# Patient Record
Sex: FEMALE | Race: BLACK OR AFRICAN AMERICAN | Employment: UNEMPLOYED | URBAN - METROPOLITAN AREA
[De-identification: names, ages, dates, MRNs, and addresses within clinical notes are randomized per-mention and may not be internally consistent; named-entity substitution may affect disease eponyms.]

---

## 2020-06-16 ENCOUNTER — HOSPITAL ENCOUNTER (INPATIENT)
Age: 25
LOS: 2 days | Discharge: HOME | DRG: 638 | End: 2020-06-18
Attending: STUDENT IN AN ORGANIZED HEALTH CARE EDUCATION/TRAINING PROGRAM | Admitting: FAMILY MEDICINE
Payer: COMMERCIAL

## 2020-06-16 ENCOUNTER — APPOINTMENT (OUTPATIENT)
Dept: GENERAL RADIOLOGY | Age: 25
DRG: 638 | End: 2020-06-16
Attending: PHYSICIAN ASSISTANT
Payer: COMMERCIAL

## 2020-06-16 ENCOUNTER — APPOINTMENT (OUTPATIENT)
Dept: CT IMAGING | Age: 25
DRG: 638 | End: 2020-06-16
Attending: FAMILY MEDICINE
Payer: COMMERCIAL

## 2020-06-16 DIAGNOSIS — E13.10 DIABETIC KETOACIDOSIS WITHOUT COMA ASSOCIATED WITH OTHER SPECIFIED DIABETES MELLITUS (HCC): Primary | ICD-10-CM

## 2020-06-16 PROBLEM — E11.10 DKA (DIABETIC KETOACIDOSES): Status: ACTIVE | Noted: 2020-06-16

## 2020-06-16 LAB
ADMINISTERED INITIALS, ADMINIT: NORMAL
ADMINISTERED INITIALS, ADMINIT: NORMAL
ALBUMIN SERPL-MCNC: 4.8 G/DL (ref 3.5–5)
ALBUMIN/GLOB SERPL: 0.9 {RATIO} (ref 1.1–2.2)
ALP SERPL-CCNC: 86 U/L (ref 45–117)
ALT SERPL-CCNC: 17 U/L (ref 12–78)
ANION GAP SERPL CALC-SCNC: 16 MMOL/L (ref 5–15)
ANION GAP SERPL CALC-SCNC: 19 MMOL/L (ref 5–15)
APPEARANCE UR: CLEAR
APTT PPP: 29.2 SEC (ref 22.1–32)
ARTERIAL PATENCY WRIST A: ABNORMAL
ARTERIAL PATENCY WRIST A: ABNORMAL
ARTERIAL PATENCY WRIST A: YES
ARTERIAL PATENCY WRIST A: YES
AST SERPL-CCNC: 8 U/L (ref 15–37)
BACTERIA URNS QL MICRO: NEGATIVE /HPF
BASE DEFICIT BLD-SCNC: 25 MMOL/L
BASE DEFICIT BLD-SCNC: 27 MMOL/L
BASOPHILS # BLD: 0 K/UL (ref 0–0.1)
BASOPHILS NFR BLD: 0 % (ref 0–1)
BDY SITE: ABNORMAL
BILIRUB SERPL-MCNC: 0.3 MG/DL (ref 0.2–1)
BILIRUB UR QL: NEGATIVE
BUN SERPL-MCNC: 16 MG/DL (ref 6–20)
BUN SERPL-MCNC: 17 MG/DL (ref 6–20)
BUN/CREAT SERPL: 18 (ref 12–20)
BUN/CREAT SERPL: 21 (ref 12–20)
CA-I BLD-SCNC: 1.45 MMOL/L (ref 1.12–1.32)
CA-I BLD-SCNC: 1.54 MMOL/L (ref 1.12–1.32)
CA-I BLD-SCNC: 1.57 MMOL/L (ref 1.12–1.32)
CA-I BLD-SCNC: 1.57 MMOL/L (ref 1.12–1.32)
CALCIUM SERPL-MCNC: 10.5 MG/DL (ref 8.5–10.1)
CALCIUM SERPL-MCNC: 9.1 MG/DL (ref 8.5–10.1)
CHLORIDE SERPL-SCNC: 113 MMOL/L (ref 97–108)
CHLORIDE SERPL-SCNC: 122 MMOL/L (ref 97–108)
CO2 SERPL-SCNC: 6 MMOL/L (ref 21–32)
CO2 SERPL-SCNC: 6 MMOL/L (ref 21–32)
COLOR UR: ABNORMAL
COMMENT, HOLDF: NORMAL
COMMENT, HOLDF: NORMAL
CREAT SERPL-MCNC: 0.75 MG/DL (ref 0.55–1.02)
CREAT SERPL-MCNC: 0.94 MG/DL (ref 0.55–1.02)
D DIMER PPP FEU-MCNC: 0.56 MG/L FEU (ref 0–0.65)
D50 ADMINISTERED, D50ADM: 0 ML
D50 ADMINISTERED, D50ADM: 0 ML
D50 ORDER, D50ORD: 0 ML
D50 ORDER, D50ORD: 0 ML
DIFFERENTIAL METHOD BLD: ABNORMAL
EOSINOPHIL # BLD: 0 K/UL (ref 0–0.4)
EOSINOPHIL NFR BLD: 0 % (ref 0–7)
EPITH CASTS URNS QL MICRO: ABNORMAL /LPF
ERYTHROCYTE [DISTWIDTH] IN BLOOD BY AUTOMATED COUNT: 14.7 % (ref 11.5–14.5)
EST. AVERAGE GLUCOSE BLD GHB EST-MCNC: 171 MG/DL
FERRITIN SERPL-MCNC: 15 NG/ML (ref 8–252)
FIBRINOGEN PPP-MCNC: 602 MG/DL (ref 200–475)
GAS FLOW.O2 O2 DELIVERY SYS: ABNORMAL L/MIN
GLOBULIN SER CALC-MCNC: 5.4 G/DL (ref 2–4)
GLSCOM COMMENTS: NORMAL
GLSCOM COMMENTS: NORMAL
GLUCOSE BLD STRIP.AUTO-MCNC: 154 MG/DL (ref 65–100)
GLUCOSE BLD STRIP.AUTO-MCNC: 172 MG/DL (ref 65–100)
GLUCOSE BLD STRIP.AUTO-MCNC: 172 MG/DL (ref 65–100)
GLUCOSE BLD STRIP.AUTO-MCNC: 177 MG/DL (ref 65–100)
GLUCOSE BLD STRIP.AUTO-MCNC: 200 MG/DL (ref 65–100)
GLUCOSE BLD STRIP.AUTO-MCNC: 250 MG/DL (ref 65–100)
GLUCOSE SERPL-MCNC: 160 MG/DL (ref 65–100)
GLUCOSE SERPL-MCNC: 183 MG/DL (ref 65–100)
GLUCOSE UR STRIP.AUTO-MCNC: >1000 MG/DL
GLUCOSE, GLC: 154 MG/DL
GLUCOSE, GLC: 172 MG/DL
HBA1C MFR BLD: 7.6 % (ref 4–5.6)
HCO3 BLD-SCNC: 4.3 MMOL/L (ref 22–26)
HCO3 BLD-SCNC: 6 MMOL/L (ref 22–26)
HCT VFR BLD AUTO: 47.5 % (ref 35–47)
HGB BLD-MCNC: 13.9 G/DL (ref 11.5–16)
HGB UR QL STRIP: NEGATIVE
HIGH TARGET, HITG: 250 MG/DL
HIGH TARGET, HITG: 250 MG/DL
IMM GRANULOCYTES # BLD AUTO: 0.2 K/UL (ref 0–0.04)
IMM GRANULOCYTES NFR BLD AUTO: 1 % (ref 0–0.5)
INR PPP: 1.1 (ref 0.9–1.1)
INSULIN ADMINSTERED, INSADM: 1.9 UNITS/HOUR
INSULIN ADMINSTERED, INSADM: 2.2 UNITS/HOUR
INSULIN ORDER, INSORD: 1.9 UNITS/HOUR
INSULIN ORDER, INSORD: 2.2 UNITS/HOUR
KETONES UR QL STRIP.AUTO: >80 MG/DL
LACTATE SERPL-SCNC: 1.9 MMOL/L (ref 0.4–2)
LDH SERPL L TO P-CCNC: 226 U/L (ref 81–246)
LEUKOCYTE ESTERASE UR QL STRIP.AUTO: NEGATIVE
LOW TARGET, LOT: 150 MG/DL
LOW TARGET, LOT: 150 MG/DL
LYMPHOCYTES # BLD: 1.2 K/UL (ref 0.8–3.5)
LYMPHOCYTES NFR BLD: 5 % (ref 12–49)
MAGNESIUM SERPL-MCNC: 2.6 MG/DL (ref 1.6–2.4)
MCH RBC QN AUTO: 23.8 PG (ref 26–34)
MCHC RBC AUTO-ENTMCNC: 29.3 G/DL (ref 30–36.5)
MCV RBC AUTO: 81.3 FL (ref 80–99)
MINUTES UNTIL NEXT BG, NBG: 60 MIN
MINUTES UNTIL NEXT BG, NBG: 60 MIN
MONOCYTES # BLD: 1.9 K/UL (ref 0–1)
MONOCYTES NFR BLD: 8 % (ref 5–13)
MULTIPLIER, MUL: 0.02
MULTIPLIER, MUL: 0.02
NEUTS SEG # BLD: 20.7 K/UL (ref 1.8–8)
NEUTS SEG NFR BLD: 86 % (ref 32–75)
NITRITE UR QL STRIP.AUTO: NEGATIVE
NRBC # BLD: 0 K/UL (ref 0–0.01)
NRBC BLD-RTO: 0 PER 100 WBC
ORDER INITIALS, ORDINIT: NORMAL
ORDER INITIALS, ORDINIT: NORMAL
PCO2 BLD: 18.1 MMHG (ref 35–45)
PCO2 BLD: 24 MMHG (ref 35–45)
PCO2 BLD: <15 MMHG (ref 35–45)
PCO2 BLD: <15 MMHG (ref 35–45)
PH BLD: 6.98 [PH] (ref 7.35–7.45)
PH BLD: 7 [PH] (ref 7.35–7.45)
PH BLD: 7.04 [PH] (ref 7.35–7.45)
PH BLD: 7.04 [PH] (ref 7.35–7.45)
PH UR STRIP: 5 [PH] (ref 5–8)
PHOSPHATE SERPL-MCNC: 3.9 MG/DL (ref 2.6–4.7)
PHOSPHATE SERPL-MCNC: 5.5 MG/DL (ref 2.6–4.7)
PLATELET # BLD AUTO: 518 K/UL (ref 150–400)
PMV BLD AUTO: 10.1 FL (ref 8.9–12.9)
PO2 BLD: 122 MMHG (ref 80–100)
PO2 BLD: 124 MMHG (ref 80–100)
PO2 BLD: 25 MMHG (ref 80–100)
PO2 BLD: 36 MMHG (ref 80–100)
POTASSIUM SERPL-SCNC: 4.6 MMOL/L (ref 3.5–5.1)
POTASSIUM SERPL-SCNC: 4.8 MMOL/L (ref 3.5–5.1)
PROCALCITONIN SERPL-MCNC: 0.11 NG/ML
PROT SERPL-MCNC: 10.2 G/DL (ref 6.4–8.2)
PROT UR STRIP-MCNC: 30 MG/DL
PROTHROMBIN TIME: 10.9 SEC (ref 9–11.1)
RBC # BLD AUTO: 5.84 M/UL (ref 3.8–5.2)
RBC #/AREA URNS HPF: ABNORMAL /HPF (ref 0–5)
RBC MORPH BLD: ABNORMAL
RBC MORPH BLD: ABNORMAL
SAMPLES BEING HELD,HOLD: NORMAL
SAMPLES BEING HELD,HOLD: NORMAL
SAO2 % BLD: 25 % (ref 92–97)
SAO2 % BLD: 42 % (ref 92–97)
SERVICE CMNT-IMP: ABNORMAL
SODIUM SERPL-SCNC: 138 MMOL/L (ref 136–145)
SODIUM SERPL-SCNC: 144 MMOL/L (ref 136–145)
SP GR UR REFRACTOMETRY: 1.02 (ref 1–1.03)
SPECIMEN TYPE: ABNORMAL
THERAPEUTIC RANGE,PTTT: NORMAL SECS (ref 58–77)
TOTAL RESP. RATE, ITRR: 38
TSH SERPL DL<=0.05 MIU/L-ACNC: 0.62 UIU/ML (ref 0.36–3.74)
UR CULT HOLD, URHOLD: NORMAL
UROBILINOGEN UR QL STRIP.AUTO: 0.2 EU/DL (ref 0.2–1)
WBC # BLD AUTO: 24 K/UL (ref 3.6–11)
WBC URNS QL MICRO: ABNORMAL /HPF (ref 0–4)

## 2020-06-16 PROCEDURE — 84100 ASSAY OF PHOSPHORUS: CPT

## 2020-06-16 PROCEDURE — 82728 ASSAY OF FERRITIN: CPT

## 2020-06-16 PROCEDURE — 83036 HEMOGLOBIN GLYCOSYLATED A1C: CPT

## 2020-06-16 PROCEDURE — 71045 X-RAY EXAM CHEST 1 VIEW: CPT

## 2020-06-16 PROCEDURE — 74176 CT ABD & PELVIS W/O CONTRAST: CPT

## 2020-06-16 PROCEDURE — 84443 ASSAY THYROID STIM HORMONE: CPT

## 2020-06-16 PROCEDURE — 80048 BASIC METABOLIC PNL TOTAL CA: CPT

## 2020-06-16 PROCEDURE — 85379 FIBRIN DEGRADATION QUANT: CPT

## 2020-06-16 PROCEDURE — 81001 URINALYSIS AUTO W/SCOPE: CPT

## 2020-06-16 PROCEDURE — 82803 BLOOD GASES ANY COMBINATION: CPT

## 2020-06-16 PROCEDURE — 74011636637 HC RX REV CODE- 636/637: Performed by: NURSE PRACTITIONER

## 2020-06-16 PROCEDURE — 87086 URINE CULTURE/COLONY COUNT: CPT

## 2020-06-16 PROCEDURE — 65660000001 HC RM ICU INTERMED STEPDOWN

## 2020-06-16 PROCEDURE — 85610 PROTHROMBIN TIME: CPT

## 2020-06-16 PROCEDURE — 74011000258 HC RX REV CODE- 258: Performed by: STUDENT IN AN ORGANIZED HEALTH CARE EDUCATION/TRAINING PROGRAM

## 2020-06-16 PROCEDURE — 74011000258 HC RX REV CODE- 258: Performed by: NURSE PRACTITIONER

## 2020-06-16 PROCEDURE — 74011250636 HC RX REV CODE- 250/636

## 2020-06-16 PROCEDURE — 36415 COLL VENOUS BLD VENIPUNCTURE: CPT

## 2020-06-16 PROCEDURE — 74011250636 HC RX REV CODE- 250/636: Performed by: STUDENT IN AN ORGANIZED HEALTH CARE EDUCATION/TRAINING PROGRAM

## 2020-06-16 PROCEDURE — 85384 FIBRINOGEN ACTIVITY: CPT

## 2020-06-16 PROCEDURE — 93005 ELECTROCARDIOGRAM TRACING: CPT

## 2020-06-16 PROCEDURE — 80053 COMPREHEN METABOLIC PANEL: CPT

## 2020-06-16 PROCEDURE — 82962 GLUCOSE BLOOD TEST: CPT

## 2020-06-16 PROCEDURE — 83615 LACTATE (LD) (LDH) ENZYME: CPT

## 2020-06-16 PROCEDURE — 85730 THROMBOPLASTIN TIME PARTIAL: CPT

## 2020-06-16 PROCEDURE — 36600 WITHDRAWAL OF ARTERIAL BLOOD: CPT

## 2020-06-16 PROCEDURE — 83735 ASSAY OF MAGNESIUM: CPT

## 2020-06-16 PROCEDURE — 71250 CT THORAX DX C-: CPT

## 2020-06-16 PROCEDURE — 84145 PROCALCITONIN (PCT): CPT

## 2020-06-16 PROCEDURE — 99285 EMERGENCY DEPT VISIT HI MDM: CPT

## 2020-06-16 PROCEDURE — 74011636637 HC RX REV CODE- 636/637: Performed by: FAMILY MEDICINE

## 2020-06-16 PROCEDURE — 87040 BLOOD CULTURE FOR BACTERIA: CPT

## 2020-06-16 PROCEDURE — 87635 SARS-COV-2 COVID-19 AMP PRB: CPT

## 2020-06-16 PROCEDURE — 74011000258 HC RX REV CODE- 258: Performed by: FAMILY MEDICINE

## 2020-06-16 PROCEDURE — 85025 COMPLETE CBC W/AUTO DIFF WBC: CPT

## 2020-06-16 PROCEDURE — 83605 ASSAY OF LACTIC ACID: CPT

## 2020-06-16 PROCEDURE — 74011000250 HC RX REV CODE- 250: Performed by: NURSE PRACTITIONER

## 2020-06-16 PROCEDURE — 96374 THER/PROPH/DIAG INJ IV PUSH: CPT

## 2020-06-16 PROCEDURE — 74011250636 HC RX REV CODE- 250/636: Performed by: PHYSICIAN ASSISTANT

## 2020-06-16 PROCEDURE — 74011250636 HC RX REV CODE- 250/636: Performed by: NURSE PRACTITIONER

## 2020-06-16 RX ORDER — METFORMIN HYDROCHLORIDE 1000 MG/1
1000 TABLET ORAL 2 TIMES DAILY WITH MEALS
COMMUNITY

## 2020-06-16 RX ORDER — ACETAMINOPHEN 650 MG/1
650 SUPPOSITORY RECTAL
Status: DISCONTINUED | OUTPATIENT
Start: 2020-06-16 | End: 2020-06-18 | Stop reason: HOSPADM

## 2020-06-16 RX ORDER — SODIUM CHLORIDE 0.9 % (FLUSH) 0.9 %
5-40 SYRINGE (ML) INJECTION EVERY 8 HOURS
Status: DISCONTINUED | OUTPATIENT
Start: 2020-06-16 | End: 2020-06-18 | Stop reason: HOSPADM

## 2020-06-16 RX ORDER — SODIUM BICARBONATE 84 MG/ML
50 INJECTION, SOLUTION INTRAVENOUS
Status: COMPLETED | OUTPATIENT
Start: 2020-06-16 | End: 2020-06-16

## 2020-06-16 RX ORDER — CETIRIZINE HCL 10 MG
10 TABLET ORAL
COMMUNITY

## 2020-06-16 RX ORDER — SODIUM CHLORIDE 0.9 % (FLUSH) 0.9 %
5-40 SYRINGE (ML) INJECTION AS NEEDED
Status: DISCONTINUED | OUTPATIENT
Start: 2020-06-16 | End: 2020-06-18 | Stop reason: HOSPADM

## 2020-06-16 RX ORDER — LORAZEPAM 2 MG/ML
INJECTION INTRAMUSCULAR
Status: COMPLETED
Start: 2020-06-16 | End: 2020-06-16

## 2020-06-16 RX ORDER — MAGNESIUM SULFATE 100 %
4 CRYSTALS MISCELLANEOUS AS NEEDED
Status: DISCONTINUED | OUTPATIENT
Start: 2020-06-16 | End: 2020-06-17

## 2020-06-16 RX ORDER — ACETAMINOPHEN 325 MG/1
650 TABLET ORAL
Status: DISCONTINUED | OUTPATIENT
Start: 2020-06-16 | End: 2020-06-18 | Stop reason: HOSPADM

## 2020-06-16 RX ORDER — DEXTROSE MONOHYDRATE AND SODIUM CHLORIDE 5; .45 G/100ML; G/100ML
200 INJECTION, SOLUTION INTRAVENOUS CONTINUOUS
Status: DISCONTINUED | OUTPATIENT
Start: 2020-06-16 | End: 2020-06-16

## 2020-06-16 RX ORDER — LORAZEPAM 2 MG/ML
1 INJECTION INTRAMUSCULAR
Status: COMPLETED | OUTPATIENT
Start: 2020-06-16 | End: 2020-06-16

## 2020-06-16 RX ORDER — ONDANSETRON 2 MG/ML
4 INJECTION INTRAMUSCULAR; INTRAVENOUS
Status: DISCONTINUED | OUTPATIENT
Start: 2020-06-16 | End: 2020-06-18 | Stop reason: HOSPADM

## 2020-06-16 RX ORDER — DIPHENHYDRAMINE HCL 25 MG
25 CAPSULE ORAL
COMMUNITY

## 2020-06-16 RX ORDER — LORAZEPAM 2 MG/ML
0.5 INJECTION INTRAMUSCULAR ONCE
Status: COMPLETED | OUTPATIENT
Start: 2020-06-16 | End: 2020-06-16

## 2020-06-16 RX ORDER — ACETAMINOPHEN 325 MG/1
650 TABLET ORAL
Status: DISCONTINUED | OUTPATIENT
Start: 2020-06-16 | End: 2020-06-16

## 2020-06-16 RX ORDER — SODIUM BICARBONATE 84 MG/ML
INJECTION, SOLUTION INTRAVENOUS
Status: DISCONTINUED
Start: 2020-06-16 | End: 2020-06-16 | Stop reason: WASHOUT

## 2020-06-16 RX ORDER — DEXTROSE MONOHYDRATE 100 MG/ML
0-250 INJECTION, SOLUTION INTRAVENOUS AS NEEDED
Status: DISCONTINUED | OUTPATIENT
Start: 2020-06-16 | End: 2020-06-17

## 2020-06-16 RX ORDER — INSULIN LISPRO 100 [IU]/ML
INJECTION, SOLUTION INTRAVENOUS; SUBCUTANEOUS
Status: DISCONTINUED | OUTPATIENT
Start: 2020-06-17 | End: 2020-06-16

## 2020-06-16 RX ORDER — DEXTROSE MONOHYDRATE AND SODIUM CHLORIDE 5; .9 G/100ML; G/100ML
250 INJECTION, SOLUTION INTRAVENOUS CONTINUOUS
Status: DISCONTINUED | OUTPATIENT
Start: 2020-06-16 | End: 2020-06-16

## 2020-06-16 RX ORDER — MELATONIN
1000 DAILY
COMMUNITY

## 2020-06-16 RX ADMIN — DEXTROSE MONOHYDRATE AND SODIUM CHLORIDE 150 ML/HR: 5; .9 INJECTION, SOLUTION INTRAVENOUS at 18:37

## 2020-06-16 RX ADMIN — SODIUM CHLORIDE 1000 ML: 900 INJECTION, SOLUTION INTRAVENOUS at 16:44

## 2020-06-16 RX ADMIN — SODIUM BICARBONATE 50 MEQ: 84 INJECTION, SOLUTION INTRAVENOUS at 23:46

## 2020-06-16 RX ADMIN — LORAZEPAM 0.5 MG: 2 INJECTION INTRAMUSCULAR at 20:55

## 2020-06-16 RX ADMIN — SODIUM CHLORIDE 1.9 UNITS/HR: 9 INJECTION, SOLUTION INTRAVENOUS at 19:13

## 2020-06-16 RX ADMIN — SODIUM BICARBONATE: 84 INJECTION, SOLUTION INTRAVENOUS at 23:47

## 2020-06-16 RX ADMIN — CEFEPIME HYDROCHLORIDE 2 G: 2 INJECTION, POWDER, FOR SOLUTION INTRAVENOUS at 19:16

## 2020-06-16 RX ADMIN — LORAZEPAM 1 MG: 2 INJECTION INTRAMUSCULAR; INTRAVENOUS at 16:51

## 2020-06-16 RX ADMIN — SODIUM CHLORIDE 0.1 UNITS/KG/HR: 9 INJECTION, SOLUTION INTRAVENOUS at 20:58

## 2020-06-16 RX ADMIN — LORAZEPAM 0.5 MG: 2 INJECTION INTRAMUSCULAR; INTRAVENOUS at 20:55

## 2020-06-16 RX ADMIN — SODIUM CHLORIDE 1000 ML: 900 INJECTION, SOLUTION INTRAVENOUS at 17:47

## 2020-06-16 RX ADMIN — SODIUM CHLORIDE 1000 ML: 900 INJECTION, SOLUTION INTRAVENOUS at 21:13

## 2020-06-16 NOTE — ED NOTES
Pt ambulatory to restroom with standby assistance. 5:48 PM  Pt wheeled back to room in wheelchair, RR increased, pt having more difficulty getting words out. HR up to 160s. PA updated and at bedside to look at patient. Dr. Arron Parr also at bedside. 1822  Intensivist at bedside.

## 2020-06-16 NOTE — PROGRESS NOTES
Admission Medication Reconciliation:        Spoke with Mother by telephone @ [phone], unable to speak with patient face to face at this time due to general isolation precautions in the ED related to COVID-19 pandemic. Mother is a reliable historian. RX query is available at this time. Interview included questions regarding use of:  PTA medications including prescription/OTC, vitamins, supplements, inhaled, topical, injectable, otic and ophthalmic medications      Medication changes (since last review): Added all agents    Thank you for allowing me to participate in the care of your patient. Rikki VargasD, RN # [phone]       Formerly [facility] pharmacy benefit data reflects medications filled and processed through the patient's insurance, however   this data does NOT capture whether the medication was picked up or is currently being taken by the patient. Allergies:  Nickel    Significant PMH/Disease States:   Past Medical History:   Diagnosis Date    Autistic spectrum disorder     Diabetes ([facility]     Kyphosis     TBI (traumatic brain injury) ([facility] [address]     Tic     Lower extremeties     Chief Complaint for this Admission:    Chief Complaint   Patient presents with    Hyperventilating    Abnormal Lab Results    Mental Health Problem     Prior to Admission Medications:   Prior to Admission Medications   Prescriptions Last Dose Informant Taking? cetirizine (ZyrTEC) 10 mg tablet   Yes   Sig: Take 10 mg by mouth daily as needed for Allergies. cholecalciferol (Vitamin D3) (1000 Units /25 mcg) tablet 6/16/2020 at Unknown time  Yes   Sig: Take 1,000 Units by mouth daily. diphenhydrAMINE (BENADRYL) 25 mg capsule 6/9/2020 at Unknown time  Yes   Sig: Take 25 mg by mouth nightly as needed. empagliflozin (Jardiance) 25 mg tablet   Yes   Sig: Take 25 mg by mouth daily. metFORMIN (GLUCOPHAGE) 1,000 mg tablet 6/16/2020 at Unknown time  Yes   Sig: Take 1,000 mg by mouth two (2) times daily (with meals). Facility-Administered Medications: None       Please contact the main inpatient pharmacy with any questions or concerns at [phone] and we will direct you to the clinical pharmacist covering this patient's care while in-house.    LOYD Red

## 2020-06-16 NOTE — ED TRIAGE NOTES
Triage:  Pt to ED via [facility] of [facility] due to abnormal lab work, elevated resp rate, and anxiety presentation. Pts mother states that the patient has become recently more anxious possibly related to COVID issues as well as the general social arrest going on currently. Pt ambulatory into triage, Pt has existing mental and physical disabilities and mom is active care giver.

## 2020-06-16 NOTE — ED NOTES
Pt transported to CT with this RN on cardiac monitor. Settled back into bed, on cardiac monitor x 4.

## 2020-06-16 NOTE — ED PROVIDER NOTES
27 y/o female with pmhx of autism spectrum disorder, DMII, TBI presents with [facility] mother [facility] due to acidosis. Mom noticed the pt acting differently x 4 days. She thought that anxiety was causing her to breath more quickly and have less sleep than usual. Mom has noticed her breathing has become faster each day. Pt has not complained of anything and states \"I feel fine\". Mom notes that the pt never complains of pain or sickness, so it is difficult to identify what is wrong. Pt takes metformin and jardiance without dose changes for at least 6 months. Mom denies cough, fever, change in urination or bowel movements. Past Medical History:   Diagnosis Date    Autistic spectrum disorder     Diabetes ([facility] [address]     Kyphosis     TBI (traumatic brain injury) ([facility] [address] [facility]     Tic     Lower extremeties       Past Surgical History:   Procedure Laterality Date    HX WISDOM TEETH EXTRACTION           History reviewed. No pertinent family history.     Social History     Socioeconomic History    Marital status: SINGLE     Spouse name: Not on file    Number of children: Not on file    Years of education: Not on file    Highest education level: Not on file   Occupational History    Not on file   Social Needs    Financial resource strain: Not on file    Food insecurity     Worry: Not on file     Inability: Not on file    Transportation needs     Medical: Not on file     Non-medical: Not on file   Tobacco Use    Smoking status: Never Smoker    Smokeless tobacco: Never Used   Substance and Sexual Activity    Alcohol use: Not on file    Drug use: Not Currently    Sexual activity: Not Currently   Lifestyle    Physical activity     Days per week: Not on file     Minutes per session: Not on file    Stress: Not on file   Relationships    Social connections     Talks on phone: Not on file     Gets together: Not on file     Attends [employer] service: Not on file     Active member of club or organization: Not on file Attends meetings of clubs or organizations: Not on file     Relationship status: Not on file    Intimate partner violence     Fear of current or ex partner: Not on file     Emotionally abused: Not on file     Physically abused: Not on file     Forced sexual activity: Not on file   Other Topics Concern    Not on file   Social History Narrative    Not on file         ALLERGIES: Nickel    Review of Systems   Constitutional: Negative for fever. HENT: Negative for congestion and sore throat. Respiratory: Negative for cough and shortness of breath. Increased respiratory rate per mom   Cardiovascular: Negative for chest pain. Gastrointestinal: Negative for abdominal pain, nausea and vomiting. Genitourinary: Negative for dysuria. Musculoskeletal: Negative for myalgias. Skin: Negative for rash. Neurological: Negative for dizziness and weakness. Vitals:    06/16/20 1710 06/16/20 1730 06/16/20 1747 06/16/20 1749   BP:  (!) 149/96 (!) 184/92    Pulse: (!) 143 (!) 147 (!) 169 (!) 166   Resp: (!) 34 23 24 28   Temp:       SpO2: 100% 100% 93% 100%   Height:                Physical Exam  Vitals signs and nursing note reviewed. Constitutional:       General: She is not in acute distress. Appearance: She is not ill-appearing. HENT:      Head: Normocephalic. Nose: No rhinorrhea. Mouth/Throat:      Mouth: Mucous membranes are moist.      Pharynx: Oropharynx is clear. No posterior oropharyngeal erythema. Eyes:      Pupils: Pupils are equal, round, and reactive to light. Neck:      Musculoskeletal: Normal range of motion. Cardiovascular:      Rate and Rhythm: Regular rhythm. Tachycardia present. Pulses: Normal pulses. Heart sounds: Normal heart sounds. Pulmonary:      Effort: Tachypnea and respiratory distress present. Breath sounds: Normal breath sounds. No decreased breath sounds or wheezing.       Comments: resp rate of about 30, pt speaking in between breaths  Chest:      Chest wall: No tenderness. Abdominal:      General: Abdomen is flat. Bowel sounds are normal. There is no distension. Palpations: Abdomen is soft. Tenderness: There is no abdominal tenderness. There is no right CVA tenderness, left CVA tenderness, guarding or rebound. Musculoskeletal:      Right lower leg: No edema. Left lower leg: No edema. Skin:     General: Skin is warm. Capillary Refill: Capillary refill takes less than 2 seconds. Findings: No erythema or rash. Neurological:      General: No focal deficit present. Mental Status: She is alert. Cranial Nerves: Cranial nerves are intact. Motor: Motor function is intact. Coordination: Coordination is intact. Psychiatric:         Mood and Affect: Mood is anxious.          Behavior: Behavior normal.         Cognition and Memory: Cognition normal.        Medications   acetaminophen (TYLENOL) tablet 650 mg (has no administration in time range)     Or   acetaminophen (TYLENOL) suppository 650 mg (has no administration in time range)   dextrose 5% and 0.9% NaCl infusion (150 mL/hr IntraVENous New Bag 6/16/20 5689)   insulin regular (NOVOLIN R, HUMULIN R) 100 Units in 0.9% sodium chloride 100 mL infusion (has no administration in time range)   insulin lispro (HUMALOG) injection (has no administration in time range)   glucose chewable tablet 16 g (has no administration in time range)   glucagon (GLUCAGEN) injection 1 mg (has no administration in time range)   dextrose 10% infusion 0-250 mL (has no administration in time range)   cefepime (MAXIPIME) 2 g in 0.9% sodium chloride (MBP/ADV) 100 mL (has no administration in time range)   cefepime (MAXIPIME) 2 g in 0.9% sodium chloride (MBP/ADV) 100 mL (has no administration in time range)   sodium chloride (NS) flush 5-40 mL (has no administration in time range)   sodium chloride (NS) flush 5-40 mL (has no administration in time range)   acetaminophen (TYLENOL) tablet 650 mg (has no administration in time range)   ondansetron (ZOFRAN) injection 4 mg (has no administration in time range)   famotidine (PF) (PEPCID) 20 mg in 0.9% sodium chloride 10 mL injection (has no administration in time range)   sodium chloride 0.9 % bolus infusion 1,000 mL (1,000 mL IntraVENous New Bag 6/16/20 1644)   LORazepam (ATIVAN) injection 1 mg (1 mg IntraVENous Given 6/16/20 1651)   sodium chloride 0.9 % bolus infusion 1,000 mL (1,000 mL IntraVENous New Bag 6/16/20 1747)     XR CHEST PORT   Final Result   IMPRESSION: No acute findings. CT CHEST WO CONT    (Results Pending)   CT ABD PELV WO CONT    (Results Pending)     Labs Reviewed   METABOLIC PANEL, COMPREHENSIVE - Abnormal; Notable for the following components:       Result Value    Chloride 113 (*)     CO2 6 (*)     Anion gap 19 (*)     Glucose 183 (*)     Calcium 10.5 (*)     AST (SGOT) 8 (*)     Protein, total 10.2 (*)     Globulin 5.4 (*)     A-G Ratio 0.9 (*)     All other components within normal limits   CBC WITH AUTOMATED DIFF - Abnormal; Notable for the following components:    WBC 24.0 (*)     RBC 5.84 (*)     HCT 47.5 (*)     MCH 23.8 (*)     MCHC 29.3 (*)     RDW 14.7 (*)     PLATELET 026 (*)     NEUTROPHILS 86 (*)     LYMPHOCYTES 5 (*)     IMMATURE GRANULOCYTES 1 (*)     ABS. NEUTROPHILS 20.7 (*)     ABS. MONOCYTES 1.9 (*)     ABS. IMM.  GRANS. 0.2 (*)     All other components within normal limits   POC EG7 - Abnormal; Notable for the following components:    Calcium, ionized (POC) 1.54 (*)     pH (POC) 7.005 (*)     pCO2 (POC) 24.0 (*)     pO2 (POC) 25 (*)     HCO3 (POC) 6.0 (*)     sO2 (POC) 25 (*)     All other components within normal limits   URINALYSIS W/MICROSCOPIC - Abnormal; Notable for the following components:    Protein 30 (*)     Glucose >1,000 (*)     Ketone >80 (*)     All other components within normal limits   FIBRINOGEN - Abnormal; Notable for the following components:    Fibrinogen 602 (*)     All other components within normal limits   GLUCOSE, POC - Abnormal; Notable for the following components:    Glucose (POC) 177 (*)     All other components within normal limits   CULTURE, BLOOD, PAIRED   URINE CULTURE HOLD SAMPLE   RESPIRATORY PANEL,PCR,NASOPHARYNGEAL   SAMPLES BEING HELD   LACTIC ACID   PROTHROMBIN TIME + INR   PTT   LD   FERRITIN   D DIMER   SARS-COV-2   PROCALCITONIN   MAGNESIUM   PHOSPHORUS   VENOUS BLOOD GAS   SAMPLES BEING HELD   METABOLIC PANEL, BASIC   MAGNESIUM   METABOLIC PANEL, BASIC   HEMOGLOBIN A1C WITH EAG   PHOSPHORUS   TSH 3RD GENERATION         MDM  Number of Diagnoses or Management Options     Amount and/or Complexity of Data Reviewed  Clinical lab tests: reviewed  Tests in the radiology section of CPT®: reviewed  Tests in the medicine section of CPT®: reviewed    Critical Care  Total time providing critical care: 30-74 minutes (45 minutes)         Procedures      Hospitalist Nixon Serve for Admission  5:56 PM    ED Room Number: ER14/14  Patient Name and age:  Yoandy Ulrich 26 y.o.  female  Working Diagnosis: Possible euglycemic DKA, metabolic acidosis, Possible Sepsis, Respiratory Distress, tachycardia, leukocytosis    COVID-19 Suspicion:  yes    Code Status:  Full Code  Readmission: no  Isolation Requirements:  yes  Recommended Level of Care:  ICU  Department:[facility] Adult ED - 21   Other:  25 y/o female with mental and physical disabilities and type 2 diabetes presents with elevated respiratory rate, tachycardia of 150-160, VBG reveals acidosis of 7.005. Lactic acid 1.9. Taking metformin and jardiance without recent change, so concern for euglycemic DKA as blood glucose in 180s. Concern for precipitation of DKA by infection without source at this time. Receiving IV fluids, waiting for UA and chest xray. [facility] Blood cultures drawn. Pt evaluated by hospitalist, Dr. Reynaldo Castaneda, and intensivist, Dr. Seamus Pena. Admitted by Dr. Reynaldo Castaneda.        Case reviewed with attending physician, Dr. Catrachito Hylton.       Matias Kam PA-C  6/16/2020

## 2020-06-16 NOTE — CONSULTS
[facility]    ICU TEAM Progress Note    Name: Anson Cockayne   : 1995   MRN: [id number]   Date: 2020      Assessment/Plan:       1. DKA  a. Unclear etiology for DKA but euglycemic DKA has been reported with Jardiance use.   i. Patient takes Jardiance and Metformin - follows with endocrine  b. No reported infectious symptoms but reasonable to test for COVID  c. Urine with >1000 glucose and >80 ketones, AG 19  d. Recommend Insulin administration with D5 containing IVF until GAP and acidosis corrects  2. Tachycardia and Tachypnea  a. Secondary to the above - acidosis and hypovolemia  b. Agree with IVF and treatment of DKA as outlined above  c. No impending signs of airway/respiratory failure  3. History of TBI and autistic spectrum disorder  4. HTN  a. Recommend PRN IV Labetalol to help both HR and BP - suspect also anxiety component  b. If persistent pass stabilization of acute symptoms may need oral antihypertensives    At this time patient is adequate for admission to [facility] for DKA management. Should her condition fails to improve or deteriorate don't hesitate to call ICU team back for reassessment. I discussed case with Angelina (ED) and Dr. Nando Evans and they agreed with plan as outlined. Subjective:   Progress Note: 2020      Reason for ICU Admission:     25 yo female with DM, autistic spectrum disorder and TBI who presented to the ER from urgent care for evaluation of abnormal labs. She presented to urgent care with increased work of breathing. No reported fevers, chills, nausea, vomits, diarrhea, chest pain, or shortness of breath. She did report feeling her heart racing. No sick contacts or recent travel. I was called to assess the patient in lieu of several electrolyte abnormalities and tachycardia/tachypnea. On my assessment patient refers feeling well. Denies chest pain, shortness of breath, or abdominal pain.        Active Problem List:     Problem List  Never Reviewed          Codes Class    DKA (diabetic ketoacidoses) ([facility] ICD-10-CM: E11.10  ICD-9-CM: 250.12               Past Medical History:      has a past medical history of Autistic spectrum disorder, Diabetes ([facility] [address] Kyphosis, TBI (traumatic brain injury) ([facility] and Tic. Past Surgical History:      has a past surgical history that includes hx wisdom teeth extraction. Home Medications:     Prior to Admission medications    Medication Sig Start Date End Date Taking? Authorizing Provider   empagliflozin (Jardiance) 25 mg tablet Take 25 mg by mouth daily. Yes Provider, Historical   metFORMIN (GLUCOPHAGE) 1,000 mg tablet Take 1,000 mg by mouth two (2) times daily (with meals). Yes Provider, Historical   cholecalciferol (Vitamin D3) (1000 Units /25 mcg) tablet Take 1,000 Units by mouth daily. Yes Provider, Historical   diphenhydrAMINE (BENADRYL) 25 mg capsule Take 25 mg by mouth nightly as needed. Yes Provider, Historical   cetirizine (ZyrTEC) 10 mg tablet Take 10 mg by mouth daily as needed for Allergies. Yes Provider, Historical       Allergies/Social/Family History: Allergies   Allergen Reactions    Nickel Rash      Social History     Tobacco Use    Smoking status: Never Smoker    Smokeless tobacco: Never Used   Substance Use Topics    Alcohol use: Not on file      History reviewed. No pertinent family history. Review of Systems:     A comprehensive review of systems was negative except for that written in the HPI.     Objective:   Vital Signs:  Visit Vitals  BP (!) 171/92   Pulse (!) 155   Temp 98.1 °F (36.7 °C)   Resp 20   Ht 5' 1\" (1.549 m)   SpO2 99%   Breastfeeding No      O2 Device: Room air   Temp (24hrs), Av.1 °F (36.7 °C), Min:98.1 °F (36.7 °C), Max:98.1 °F (36.7 °C)           Intake/Output:   No intake or output data in the 24 hours ending 20 5815    Physical Exam:    General: NAD  HENT: Atraumatic  Cardio: RRR, tachycardia  Respiratory: CTA BL  GI: Soft not tender abdomen  Extremities: -ve edema  Neuro: grossly intact      LABS AND  DATA: Personally reviewed  Recent Labs     06/16/20  1642   WBC 24.0*   HGB 13.9   HCT 47.5*   *     Recent Labs     06/16/20  1642      K 4.6   *   CO2 6*   BUN 17   CREA 0.94   *   CA 10.5*     Recent Labs     06/16/20  1642   AP 86   TP 10.2*   ALB 4.8   GLOB 5.4*     No results for input(s): INR, PTP, APTT, INREXT, INREXT in the last 72 hours. Recent Labs     06/16/20  1643   PHI 7.005*   PCO2I 24.0*   PO2I 25*     No results for input(s): CPK, CKMB, TROIQ, BNPP in the last 72 hours. Hemodynamics:   PAP:   CO:     Wedge:   CI:     CVP:    SVR:       PVR:       Ventilator Settings:  Mode Rate Tidal Volume Pressure FiO2 PEEP                    Peak airway pressure:      Minute ventilation:          MEDS: Reviewed    Chest X-Ray:  CXR Results  (Last 48 hours)               06/16/20 1814  XR CHEST PORT Final result    Impression:  IMPRESSION: No acute findings. Narrative:  EXAM: XR CHEST PORT       INDICATION: tachypnea       COMPARISON: None. FINDINGS: A portable AP radiograph of the chest was obtained at 1809 hours. There is no pneumothorax or pleural effusion. The lungs are clear. The cardiac   and mediastinal contours and pulmonary vascularity are normal. No hilar   enlargement is shown. The bones and soft tissues are grossly within normal   limits. ECHO:  None on record    DISPOSITION  Transfer to non-ICU bed    CRITICAL CARE CONSULTANT NOTE  I had a face to face encounter with the patient, reviewed and interpreted patient data including clinical events, labs, images, vital signs, I/O's, and examined patient.   I have discussed the case and the plan and management of the patient's care with the consulting services, the bedside nurses and the respiratory therapist.      NOTE OF PERSONAL INVOLVEMENT IN CARE   This patient has a high probability of imminent, clinically significant deterioration, which requires the highest level of preparedness to intervene urgently. I participated in the decision-making and personally managed or directed the management of the following life and organ supporting interventions that required my frequent assessment to treat or prevent imminent deterioration. I personally spent 35 minutes of critical care time. This is time spent at this critically ill patient's bedside actively involved in patient care as well as the coordination of care and discussions with the patient's family. This does not include any procedural time which has been billed separately.     Jeanine Culver MD  [address]  6/16/2020

## 2020-06-17 LAB
ADMINISTERED INITIALS, ADMINIT: NORMAL
ANION GAP SERPL CALC-SCNC: 12 MMOL/L (ref 5–15)
ANION GAP SERPL CALC-SCNC: 15 MMOL/L (ref 5–15)
ANION GAP SERPL CALC-SCNC: 9 MMOL/L (ref 5–15)
ANION GAP SERPL CALC-SCNC: 9 MMOL/L (ref 5–15)
APTT PPP: 25.8 SEC (ref 22.1–32)
ARTERIAL PATENCY WRIST A: YES
ATRIAL RATE: 141 BPM
B-OH-BUTYR SERPL-SCNC: 1.59 MMOL/L
B-OH-BUTYR SERPL-SCNC: 2.94 MMOL/L
BACTERIA SPEC CULT: NORMAL
BASE DEFICIT BLD-SCNC: 14 MMOL/L
BASE DEFICIT BLD-SCNC: 17 MMOL/L
BASE DEFICIT BLD-SCNC: 17 MMOL/L
BASOPHILS # BLD: 0 K/UL (ref 0–0.1)
BASOPHILS NFR BLD: 0 % (ref 0–1)
BDY SITE: ABNORMAL
BUN SERPL-MCNC: 10 MG/DL (ref 6–20)
BUN SERPL-MCNC: 13 MG/DL (ref 6–20)
BUN SERPL-MCNC: 7 MG/DL (ref 6–20)
BUN SERPL-MCNC: 8 MG/DL (ref 6–20)
BUN/CREAT SERPL: 11 (ref 12–20)
BUN/CREAT SERPL: 15 (ref 12–20)
BUN/CREAT SERPL: 15 (ref 12–20)
BUN/CREAT SERPL: 17 (ref 12–20)
CA-I BLD-SCNC: 1.45 MMOL/L (ref 1.12–1.32)
CA-I BLD-SCNC: 1.47 MMOL/L (ref 1.12–1.32)
CA-I BLD-SCNC: 1.48 MMOL/L (ref 1.12–1.32)
CALCIUM SERPL-MCNC: 8.7 MG/DL (ref 8.5–10.1)
CALCIUM SERPL-MCNC: 9 MG/DL (ref 8.5–10.1)
CALCIUM SERPL-MCNC: 9.2 MG/DL (ref 8.5–10.1)
CALCIUM SERPL-MCNC: 9.6 MG/DL (ref 8.5–10.1)
CALCULATED P AXIS, ECG09: 60 DEGREES
CALCULATED R AXIS, ECG10: 87 DEGREES
CALCULATED T AXIS, ECG11: 51 DEGREES
CHLORIDE SERPL-SCNC: 120 MMOL/L (ref 97–108)
CHLORIDE SERPL-SCNC: 123 MMOL/L (ref 97–108)
CHLORIDE SERPL-SCNC: 124 MMOL/L (ref 97–108)
CHLORIDE SERPL-SCNC: 128 MMOL/L (ref 97–108)
CO2 SERPL-SCNC: 11 MMOL/L (ref 21–32)
CO2 SERPL-SCNC: 12 MMOL/L (ref 21–32)
CO2 SERPL-SCNC: 16 MMOL/L (ref 21–32)
CO2 SERPL-SCNC: 5 MMOL/L (ref 21–32)
CREAT SERPL-MCNC: 0.54 MG/DL (ref 0.55–1.02)
CREAT SERPL-MCNC: 0.62 MG/DL (ref 0.55–1.02)
CREAT SERPL-MCNC: 0.68 MG/DL (ref 0.55–1.02)
CREAT SERPL-MCNC: 0.77 MG/DL (ref 0.55–1.02)
D DIMER PPP FEU-MCNC: 0.71 MG/L FEU (ref 0–0.65)
D50 ADMINISTERED, D50ADM: 0 ML
D50 ORDER, D50ORD: 0 ML
DIAGNOSIS, 93000: NORMAL
DIFFERENTIAL METHOD BLD: ABNORMAL
EOSINOPHIL # BLD: 0 K/UL (ref 0–0.4)
EOSINOPHIL NFR BLD: 0 % (ref 0–7)
ERYTHROCYTE [DISTWIDTH] IN BLOOD BY AUTOMATED COUNT: 14.5 % (ref 11.5–14.5)
FIBRINOGEN PPP-MCNC: 236 MG/DL (ref 200–475)
GAS FLOW.O2 O2 DELIVERY SYS: ABNORMAL L/MIN
GLSCOM COMMENTS: NORMAL
GLUCOSE BLD STRIP.AUTO-MCNC: 102 MG/DL (ref 65–100)
GLUCOSE BLD STRIP.AUTO-MCNC: 116 MG/DL (ref 65–100)
GLUCOSE BLD STRIP.AUTO-MCNC: 118 MG/DL (ref 65–100)
GLUCOSE BLD STRIP.AUTO-MCNC: 119 MG/DL (ref 65–100)
GLUCOSE BLD STRIP.AUTO-MCNC: 126 MG/DL (ref 65–100)
GLUCOSE BLD STRIP.AUTO-MCNC: 132 MG/DL (ref 65–100)
GLUCOSE BLD STRIP.AUTO-MCNC: 134 MG/DL (ref 65–100)
GLUCOSE BLD STRIP.AUTO-MCNC: 139 MG/DL (ref 65–100)
GLUCOSE BLD STRIP.AUTO-MCNC: 142 MG/DL (ref 65–100)
GLUCOSE BLD STRIP.AUTO-MCNC: 148 MG/DL (ref 65–100)
GLUCOSE BLD STRIP.AUTO-MCNC: 151 MG/DL (ref 65–100)
GLUCOSE BLD STRIP.AUTO-MCNC: 158 MG/DL (ref 65–100)
GLUCOSE BLD STRIP.AUTO-MCNC: 161 MG/DL (ref 65–100)
GLUCOSE BLD STRIP.AUTO-MCNC: 166 MG/DL (ref 65–100)
GLUCOSE BLD STRIP.AUTO-MCNC: 171 MG/DL (ref 65–100)
GLUCOSE BLD STRIP.AUTO-MCNC: 179 MG/DL (ref 65–100)
GLUCOSE BLD STRIP.AUTO-MCNC: 198 MG/DL (ref 65–100)
GLUCOSE BLD STRIP.AUTO-MCNC: 225 MG/DL (ref 65–100)
GLUCOSE BLD STRIP.AUTO-MCNC: 264 MG/DL (ref 65–100)
GLUCOSE SERPL-MCNC: 149 MG/DL (ref 65–100)
GLUCOSE SERPL-MCNC: 156 MG/DL (ref 65–100)
GLUCOSE SERPL-MCNC: 187 MG/DL (ref 65–100)
GLUCOSE SERPL-MCNC: 219 MG/DL (ref 65–100)
GLUCOSE, GLC: 102 MG/DL
GLUCOSE, GLC: 134 MG/DL
GLUCOSE, GLC: 134 MG/DL
GLUCOSE, GLC: 139 MG/DL
GLUCOSE, GLC: 151 MG/DL
HCO3 BLD-SCNC: 11.3 MMOL/L (ref 22–26)
HCO3 BLD-SCNC: 9.3 MMOL/L (ref 22–26)
HCO3 BLD-SCNC: 9.4 MMOL/L (ref 22–26)
HCT VFR BLD AUTO: 38.4 % (ref 35–47)
HGB BLD-MCNC: 11.5 G/DL (ref 11.5–16)
HIGH TARGET, HITG: 250 MG/DL
IMM GRANULOCYTES # BLD AUTO: 0.1 K/UL (ref 0–0.04)
IMM GRANULOCYTES NFR BLD AUTO: 1 % (ref 0–0.5)
INR PPP: 1.3 (ref 0.9–1.1)
INSULIN ADMINSTERED, INSADM: 0 UNITS/HOUR
INSULIN ADMINSTERED, INSADM: 0 UNITS/HOUR
INSULIN ADMINSTERED, INSADM: 0.7 UNITS/HOUR
INSULIN ADMINSTERED, INSADM: 1.6 UNITS/HOUR
INSULIN ADMINSTERED, INSADM: 1.8 UNITS/HOUR
INSULIN ORDER, INSORD: 0 UNITS/HOUR
INSULIN ORDER, INSORD: 0 UNITS/HOUR
INSULIN ORDER, INSORD: 0.7 UNITS/HOUR
INSULIN ORDER, INSORD: 1.6 UNITS/HOUR
INSULIN ORDER, INSORD: 1.8 UNITS/HOUR
LOW TARGET, LOT: 150 MG/DL
LYMPHOCYTES # BLD: 1.7 K/UL (ref 0.8–3.5)
LYMPHOCYTES NFR BLD: 11 % (ref 12–49)
MAGNESIUM SERPL-MCNC: 1.8 MG/DL (ref 1.6–2.4)
MAGNESIUM SERPL-MCNC: 2 MG/DL (ref 1.6–2.4)
MAGNESIUM SERPL-MCNC: 2 MG/DL (ref 1.6–2.4)
MAGNESIUM SERPL-MCNC: 2.2 MG/DL (ref 1.6–2.4)
MCH RBC QN AUTO: 23.4 PG (ref 26–34)
MCHC RBC AUTO-ENTMCNC: 29.9 G/DL (ref 30–36.5)
MCV RBC AUTO: 78.2 FL (ref 80–99)
MINUTES UNTIL NEXT BG, NBG: 60 MIN
MONOCYTES # BLD: 1.7 K/UL (ref 0–1)
MONOCYTES NFR BLD: 12 % (ref 5–13)
MULTIPLIER, MUL: 0
MULTIPLIER, MUL: 0
MULTIPLIER, MUL: 0.01
MULTIPLIER, MUL: 0.02
MULTIPLIER, MUL: 0.02
NEUTS SEG # BLD: 11.4 K/UL (ref 1.8–8)
NEUTS SEG NFR BLD: 76 % (ref 32–75)
NRBC # BLD: 0 K/UL (ref 0–0.01)
NRBC BLD-RTO: 0 PER 100 WBC
ORDER INITIALS, ORDINIT: NORMAL
P-R INTERVAL, ECG05: 128 MS
PCO2 BLD: 19.4 MMHG (ref 35–45)
PCO2 BLD: 19.5 MMHG (ref 35–45)
PCO2 BLD: 19.8 MMHG (ref 35–45)
PH BLD: 7.28 [PH] (ref 7.35–7.45)
PH BLD: 7.29 [PH] (ref 7.35–7.45)
PH BLD: 7.37 [PH] (ref 7.35–7.45)
PHOSPHATE SERPL-MCNC: 3.3 MG/DL (ref 2.6–4.7)
PHOSPHATE SERPL-MCNC: <0.5 MG/DL (ref 2.6–4.7)
PLATELET # BLD AUTO: 348 K/UL (ref 150–400)
PMV BLD AUTO: 10.1 FL (ref 8.9–12.9)
PO2 BLD: 111 MMHG (ref 80–100)
PO2 BLD: 111 MMHG (ref 80–100)
PO2 BLD: 116 MMHG (ref 80–100)
POTASSIUM SERPL-SCNC: 2.4 MMOL/L (ref 3.5–5.1)
POTASSIUM SERPL-SCNC: 2.7 MMOL/L (ref 3.5–5.1)
POTASSIUM SERPL-SCNC: 3.7 MMOL/L (ref 3.5–5.1)
POTASSIUM SERPL-SCNC: 4.8 MMOL/L (ref 3.5–5.1)
PROTHROMBIN TIME: 13.5 SEC (ref 9–11.1)
Q-T INTERVAL, ECG07: 360 MS
QRS DURATION, ECG06: 74 MS
QTC CALCULATION (BEZET), ECG08: 551 MS
RBC # BLD AUTO: 4.91 M/UL (ref 3.8–5.2)
SAO2 % BLD: 98 % (ref 92–97)
SARS-COV-2, COV2: NOT DETECTED
SERVICE CMNT-IMP: ABNORMAL
SERVICE CMNT-IMP: NORMAL
SODIUM SERPL-SCNC: 140 MMOL/L (ref 136–145)
SODIUM SERPL-SCNC: 148 MMOL/L (ref 136–145)
SOURCE, COVRS: NORMAL
SPECIMEN SOURCE, FCOV2M: NORMAL
SPECIMEN TYPE: ABNORMAL
THERAPEUTIC RANGE,PTTT: NORMAL SECS (ref 58–77)
VENTRICULAR RATE, ECG03: 141 BPM
WBC # BLD AUTO: 14.9 K/UL (ref 3.6–11)

## 2020-06-17 PROCEDURE — 82962 GLUCOSE BLOOD TEST: CPT

## 2020-06-17 PROCEDURE — 74011000250 HC RX REV CODE- 250: Performed by: NURSE PRACTITIONER

## 2020-06-17 PROCEDURE — 85379 FIBRIN DEGRADATION QUANT: CPT

## 2020-06-17 PROCEDURE — 85610 PROTHROMBIN TIME: CPT

## 2020-06-17 PROCEDURE — 82803 BLOOD GASES ANY COMBINATION: CPT

## 2020-06-17 PROCEDURE — 84100 ASSAY OF PHOSPHORUS: CPT

## 2020-06-17 PROCEDURE — 83735 ASSAY OF MAGNESIUM: CPT

## 2020-06-17 PROCEDURE — 65270000029 HC RM PRIVATE

## 2020-06-17 PROCEDURE — 74011000250 HC RX REV CODE- 250: Performed by: INTERNAL MEDICINE

## 2020-06-17 PROCEDURE — 85384 FIBRINOGEN ACTIVITY: CPT

## 2020-06-17 PROCEDURE — 74011250636 HC RX REV CODE- 250/636: Performed by: INTERNAL MEDICINE

## 2020-06-17 PROCEDURE — 80048 BASIC METABOLIC PNL TOTAL CA: CPT

## 2020-06-17 PROCEDURE — 74011250637 HC RX REV CODE- 250/637: Performed by: INTERNAL MEDICINE

## 2020-06-17 PROCEDURE — 74011250636 HC RX REV CODE- 250/636: Performed by: FAMILY MEDICINE

## 2020-06-17 PROCEDURE — 36600 WITHDRAWAL OF ARTERIAL BLOOD: CPT

## 2020-06-17 PROCEDURE — 74011250636 HC RX REV CODE- 250/636: Performed by: NURSE PRACTITIONER

## 2020-06-17 PROCEDURE — 82010 KETONE BODYS QUAN: CPT

## 2020-06-17 PROCEDURE — 36415 COLL VENOUS BLD VENIPUNCTURE: CPT

## 2020-06-17 PROCEDURE — 85025 COMPLETE CBC W/AUTO DIFF WBC: CPT

## 2020-06-17 PROCEDURE — 74011000258 HC RX REV CODE- 258: Performed by: FAMILY MEDICINE

## 2020-06-17 PROCEDURE — 74011636637 HC RX REV CODE- 636/637: Performed by: INTERNAL MEDICINE

## 2020-06-17 PROCEDURE — 74011000250 HC RX REV CODE- 250: Performed by: FAMILY MEDICINE

## 2020-06-17 PROCEDURE — 65660000000 HC RM CCU STEPDOWN

## 2020-06-17 PROCEDURE — 74011636637 HC RX REV CODE- 636/637: Performed by: NURSE PRACTITIONER

## 2020-06-17 PROCEDURE — 74011000258 HC RX REV CODE- 258: Performed by: NURSE PRACTITIONER

## 2020-06-17 PROCEDURE — 85730 THROMBOPLASTIN TIME PARTIAL: CPT

## 2020-06-17 RX ORDER — POTASSIUM CHLORIDE 750 MG/1
40 TABLET, FILM COATED, EXTENDED RELEASE ORAL 2 TIMES DAILY WITH MEALS
Status: DISCONTINUED | OUTPATIENT
Start: 2020-06-17 | End: 2020-06-18 | Stop reason: HOSPADM

## 2020-06-17 RX ORDER — SODIUM BICARBONATE IN D5W 150/1000ML
PLASTIC BAG, INJECTION (ML) INTRAVENOUS CONTINUOUS
Status: DISCONTINUED | OUTPATIENT
Start: 2020-06-17 | End: 2020-06-17

## 2020-06-17 RX ORDER — LABETALOL HYDROCHLORIDE 5 MG/ML
20 INJECTION, SOLUTION INTRAVENOUS
Status: DISCONTINUED | OUTPATIENT
Start: 2020-06-17 | End: 2020-06-18 | Stop reason: HOSPADM

## 2020-06-17 RX ORDER — DEXTROSE MONOHYDRATE 100 MG/ML
0-250 INJECTION, SOLUTION INTRAVENOUS AS NEEDED
Status: DISCONTINUED | OUTPATIENT
Start: 2020-06-17 | End: 2020-06-18 | Stop reason: HOSPADM

## 2020-06-17 RX ORDER — DEXTROSE MONOHYDRATE 50 MG/ML
100 INJECTION, SOLUTION INTRAVENOUS CONTINUOUS
Status: DISCONTINUED | OUTPATIENT
Start: 2020-06-17 | End: 2020-06-17

## 2020-06-17 RX ORDER — DEXTROSE, SODIUM CHLORIDE, AND POTASSIUM CHLORIDE 5; .45; .15 G/100ML; G/100ML; G/100ML
125 INJECTION INTRAVENOUS CONTINUOUS
Status: DISCONTINUED | OUTPATIENT
Start: 2020-06-17 | End: 2020-06-17

## 2020-06-17 RX ORDER — INSULIN GLARGINE 100 [IU]/ML
10 INJECTION, SOLUTION SUBCUTANEOUS DAILY
Status: DISCONTINUED | OUTPATIENT
Start: 2020-06-17 | End: 2020-06-18 | Stop reason: HOSPADM

## 2020-06-17 RX ORDER — INSULIN GLARGINE 100 [IU]/ML
10 INJECTION, SOLUTION SUBCUTANEOUS DAILY
Status: DISCONTINUED | OUTPATIENT
Start: 2020-06-17 | End: 2020-06-17

## 2020-06-17 RX ORDER — DEXTROSE AND POTASSIUM CHLORIDE 5; .15 G/100ML; G/100ML
SOLUTION INTRAVENOUS CONTINUOUS
Status: DISPENSED | OUTPATIENT
Start: 2020-06-17 | End: 2020-06-17

## 2020-06-17 RX ORDER — MAGNESIUM SULFATE 100 %
4 CRYSTALS MISCELLANEOUS AS NEEDED
Status: DISCONTINUED | OUTPATIENT
Start: 2020-06-17 | End: 2020-06-18 | Stop reason: HOSPADM

## 2020-06-17 RX ORDER — POTASSIUM CHLORIDE 7.45 MG/ML
10 INJECTION INTRAVENOUS
Status: COMPLETED | OUTPATIENT
Start: 2020-06-17 | End: 2020-06-17

## 2020-06-17 RX ORDER — INSULIN LISPRO 100 [IU]/ML
INJECTION, SOLUTION INTRAVENOUS; SUBCUTANEOUS
Status: ACTIVE | OUTPATIENT
Start: 2020-06-17 | End: 2020-06-17

## 2020-06-17 RX ADMIN — SODIUM CHLORIDE 0.1 UNITS/KG/HR: 9 INJECTION, SOLUTION INTRAVENOUS at 01:42

## 2020-06-17 RX ADMIN — SODIUM CHLORIDE: 900 INJECTION, SOLUTION INTRAVENOUS at 15:00

## 2020-06-17 RX ADMIN — Medication 10 ML: at 00:25

## 2020-06-17 RX ADMIN — POTASSIUM CHLORIDE 40 MEQ: 750 TABLET, FILM COATED, EXTENDED RELEASE ORAL at 12:46

## 2020-06-17 RX ADMIN — POTASSIUM CHLORIDE 10 MEQ: 10 INJECTION, SOLUTION INTRAVENOUS at 10:43

## 2020-06-17 RX ADMIN — Medication 10 ML: at 08:43

## 2020-06-17 RX ADMIN — POTASSIUM CHLORIDE 10 MEQ: 10 INJECTION, SOLUTION INTRAVENOUS at 08:43

## 2020-06-17 RX ADMIN — POTASSIUM CHLORIDE 40 MEQ: 750 TABLET, FILM COATED, EXTENDED RELEASE ORAL at 18:12

## 2020-06-17 RX ADMIN — DEXTROSE MONOHYDRATE AND POTASSIUM CHLORIDE: 5; .149 INJECTION, SOLUTION INTRAVENOUS at 15:34

## 2020-06-17 RX ADMIN — CEFEPIME HYDROCHLORIDE 2 G: 2 INJECTION, POWDER, FOR SOLUTION INTRAVENOUS at 03:37

## 2020-06-17 RX ADMIN — SODIUM BICARBONATE 150 MEQ/1,000 ML IN DEXTROSE 5 % INTRAVENOUS: SOLUTION at 01:38

## 2020-06-17 RX ADMIN — POTASSIUM CHLORIDE 10 MEQ: 10 INJECTION, SOLUTION INTRAVENOUS at 17:15

## 2020-06-17 RX ADMIN — POTASSIUM CHLORIDE 10 MEQ: 10 INJECTION, SOLUTION INTRAVENOUS at 07:21

## 2020-06-17 RX ADMIN — SODIUM BICARBONATE 150 MEQ/1,000 ML IN DEXTROSE 5 % INTRAVENOUS: SOLUTION at 06:51

## 2020-06-17 RX ADMIN — INSULIN GLARGINE 10 UNITS: 100 INJECTION, SOLUTION SUBCUTANEOUS at 16:26

## 2020-06-17 RX ADMIN — Medication 10 ML: at 14:00

## 2020-06-17 RX ADMIN — LABETALOL HYDROCHLORIDE 20 MG: 5 INJECTION INTRAVENOUS at 01:43

## 2020-06-17 RX ADMIN — DEXTROSE MONOHYDRATE 100 ML/HR: 5 INJECTION, SOLUTION INTRAVENOUS at 03:48

## 2020-06-17 RX ADMIN — FAMOTIDINE 20 MG: 10 INJECTION, SOLUTION INTRAVENOUS at 08:43

## 2020-06-17 RX ADMIN — POTASSIUM CHLORIDE 10 MEQ: 10 INJECTION, SOLUTION INTRAVENOUS at 15:42

## 2020-06-17 RX ADMIN — DEXTROSE MONOHYDRATE, SODIUM CHLORIDE, AND POTASSIUM CHLORIDE 125 ML/HR: 50; 4.5; 1.49 INJECTION, SOLUTION INTRAVENOUS at 08:44

## 2020-06-17 NOTE — PROGRESS NOTES
Attempted to page admitting hospitalist, but redirected to on call hospitalist for floor Corey Parks NP. Informed about patient status and acuity of patient. Concerns voiced about IMCU being correct placement for patient's needs. Informed that decision has been made by Intensivist and admitting hospitalist at this time to admit to [facility] and we will reevaluate patient's status for need of increased level of care. Call also placed to intensivist Kash MILLER to discuss patient and clarify orders for insulin drip, bicarb drip, d5 infusion, and need for central IV access. Provider states that he will be down to see patient soon and discuss orders. Informed that per protocol for the Covid unit we cannot use glucostabilizer and that insulin drips require Q 1 Hour glucose checks. Informed that we will not be titrating insulin drip at this time so we will not require the use of glucostabilizer at this time. Patient will be on bicarb infusion with D5 solution admixed. Orders also given to push the dose  bicarb 50 mg IVP once that was due in ED but not given.

## 2020-06-17 NOTE — PROGRESS NOTES
Reason for Admission:   Admitted from home with complaints of elevated pulse and respirations. Has a history of Autism and DM. RUR Score:   10%-low                 Plan for utilizing home health:     She lives with her mother. PCP: First and Last name:  Salvador Browne   Name of Practice:    Are you a current patient: Yes/No: YES   Approximate date of last visit: 4/2020   Can you participate in a virtual visit with your PCP: Yes                    Current Advanced Directive/Advance Care Plan:   Does not have and unable to complete secondary to Autism. Mother is NOK.                          Transition of Care Plan:   Once stable discharge home with family    Care Management Interventions  PCP Verified by CM: Alvaro Nielsen)  Palliative Care Criteria Met (RRAT>21 & CHF Dx)?: No  Mode of Transport at Discharge: (private car)  Current Support Network: (Lives at home with family)  Confirm Follow Up Transport: Family    Vera Dodge RN CRM

## 2020-06-17 NOTE — H&P
[address]  HISTORY AND PHYSICAL    Name:  Anay Briseno  MR#:  [id number]  :  1995  ACCOUNT #:  [de-identified]  ADMIT DATE:  2020    CHIEF COMPLAINT:  Tachypnea. HISTORY OF PRESENT ILLNESS:  The patient is a 69-year-old female with a past medical history of autism spectrum, TBI, who presents to the hospital with the above-mentioned symptoms. The patient is somewhat anxious. History was primarily obtained from the mother. The mother reports that the patient has been at baseline health, was doing well. Today, started having some tachypnea and started having rapid breathing. She also had some trouble swallowing today. The mother got concerned, took her to [facility] and her labs were found to be abnormal.  The patient was sent to the ER. In the ER, the patient has a heart rate of 150s to 160s, has an anion gap and appears to be acidotic. The patient's sugars are around 180. The mother reports that the patient has a history of diabetes mellitus, follows up with an endocrinologist, but has not been able to follow up with the endocrinologist for the last few weeks because of COVID-19. She reports that the patient has not had any COVID-19 sick contacts. She has not had any fever, chills, or cough associated with her symptoms. She reports that the patient went to the [employer] yesterday, but took precautions for social distancing. The patient was requested to be admitted to the hospitalist service. Currently, the patient denies any other complaints or problems. Denies any headache, blurry vision, sore throat, trouble swallowing, trouble with speech, chest pain, shortness of breath, cough, fever, chills, abdominal pain, constipation, diarrhea, urinary symptoms, focal or generalized neurological weakness, recent travel, sick contact, falls, injuries, hematemesis, melena, hemoptysis, hematuria or any other concerns or problems.     PAST MEDICAL HISTORY:  See above.    HOME MEDICATIONS:  Jardiance 25 mg daily, metformin 1000 mg b.i.d., cholecalciferol, Benadryl 25 mg daily, and Zyrtec 10 mg daily for allergies. ALLERGIES:  NICKEL. SOCIAL HISTORY:  No use of tobacco abuse, alcohol use, or IV drug abuse. The patient used to work at a [employer], but has not been working since the COVID-19 pandemic. FAMILY HISTORY:  Discussed. No history of strokes in the family. REVIEW OF SYMPTOMS:  All systems were reviewed and found to be essentially negative except for the symptoms mentioned above. PHYSICAL EXAMINATION:  VITAL SIGNS:  Temperature 97.5, pulse 150, respiratory rate 35, blood pressure 171/92, pulse oximetry 100% on room air. GENERAL:  Alert x3, awake, anxious female, appears to be stated age. HEENT:  Pupils equal and reactive to light. Dry mucous membranes. Tympanic membranes clear. NECK:  Supple. CHEST:  Clear to auscultation bilaterally. CORONARY:  S1, S2 heard. ABDOMEN:  Soft, nontender, nondistended. Bowel sounds are physiological.  EXTREMITIES:  No clubbing, no cyanosis, no edema. NEURO/PSYCH:  Pleasant mood and affect. Cranial nerves II through XII grossly intact. Sensory grossly within normal limits. DTRs 1+ x4. Strength 5/5. SKIN:  Warm. LABORATORY DATA:  White count 24.0, hemoglobin 13.9, hematocrit 47.5, platelets 513. INR 1.1. Sodium 138, potassium 4.6, chloride 113, bicarbonate 6, anion gap 19, glucose 183, BUN 17, creatinine 0.94, calcium 10.5, bilirubin total 0.3, ALT 17, AST 8, alkaline phosphatase 86. Lactic acid 1.9.  . VBG shows a pH of 7.005, pCO2 of 24, pO2 of 25. EKG shows sinus tachycardia. Blood cultures, SARS-COVID-2 is pending. X-ray of the chest shows no acute finding. ASSESSMENT AND PLAN:  1. Diabetic ketoacidosis, euglycemic. The patient will be admitted on a telemetry bed. Start the patient on D5 normal saline, insulin drip. Look for any occult COVID type infection. BMP every 4 hours.   Repeat ABG in 4 hours. Close the anion gap. Once anion gap closes and acidosis resolves, we will switch to scheduled insulin. Continue to closely monitor. Further intervention per hospital course. Reassess as needed. 2.  Leukocytosis, unclear etiology. We will provide empirical intravenous antibiotics. Blood cultures, urine culture, CT of the chest, abdomen and pelvis to rule out any acute abnormality and further intervention per hospital course. Continue to closely monitor. Further intervention per hospital course. Reassess as needed. 3.  Tachycardia and tachypnea, likely driven by diabetic ketoacidosis, but there may be a component of anxiety. We will provide intravenous hydration. The patient received one dose of Ativan. Should improve with hydration and treating the underlying cause. If persists, may consider further intervention and diagnostics. Continue to closely monitor. Reassess as needed. 4.  Hypertension, likely anxiety driven. Continue to monitor. Should improve with improving symptoms and if anxiety persists, may consider anxiolytics. Further intervention per hospital course. Continue to monitor. 5.  Gastrointestinal and deep venous thrombosis prophylaxis. The patient will be on heparin.       Jeaneth Renae MD      [id number]  D:  06/16/2020 19:15  T:  06/16/2020 22:39  JOB #:  [id number]

## 2020-06-17 NOTE — PROGRESS NOTES
2340 (approx) - Pt admitted to [facility]. Intensivist JW Tobar) at bedside. RN discussed plan of care and interventions w/ intensivist. Peripheral IV access obtained and labs drawn from line. Bicarb bolus administered and D5/w Bicarb gtt initiated. Normal D5 stopped. RN clarified modified DKA order set w/ intensivist. BMP and Mag okay to be drawn q6. Insulin gtt not titratable and set @ continuous rate based on Pt weight and current blood glucose level. Q1 accu checks. D5 w/ Bicarb rate changed to 225 ml/hr. 0050 (approx) - Overnight Hospitalist NP paged. Verified that Pt is under hospitalist service. Modified DKA order set reviewed w/ NP and orders adjusted to meet parameters. Pt continues to be tachycardic. HR 140s-150s. NP placed order for PRN labetalol as referenced in Dr. Muna Mccurdy note. 0156 - Pt's blood glucose 116. NP paged. 0200 (approx) - Pt's glucose 119 on recheck. Pt given 8oz of apple juice while awaiting return call from NP.     0230 (approx) - Pt's glucose 118 on recheck. Glucose Gtt paused per NP.    0330 (approx) - Insulin gtt restarted at adjusted rate per hospitalist NP. Intensivist at bedside to check on Pt. Orders recieved to adjust rate on Sodium Bicarb & D5 gtt and to restart normal D5 @ 100 ml/hr. 0415 (approx) - RN notified that add on BMP and AM CBC were contaminated. Will redrawn CBC w/ 0500 BMP.        0700 (approx) - Pt's lab work shows improvement in anion gap. NP notified Pt's K+ 2.4. Blood glucose better controlled w/ new insulin gtt rate. ABG completed. Pt is resting comfortably at this time. 0730 (approx) - K+ replete ordered and started. Bicard & d5 DCed per intensivist.      Verbal shift change report given to [facility] (oncoming nurse) by Sabas Mohamud RN (offgoing nurse). Report included the following information SBAR, Kardex, ED Summary, Procedure Summary, Intake/Output, MAR and Recent Results.

## 2020-06-17 NOTE — ED NOTES
Magnesium and BMP levels hemolyzed in lab. Patient currently in transport to [facility] with primary RN. Primary RN, Ethan Min Notified, she will notify receiving RN.

## 2020-06-17 NOTE — ED NOTES
Per Mary Adams, NP, 200 ABG to be done now prior to decision to admit to ICU. Pt remains tachycardic,and tachypneic.

## 2020-06-17 NOTE — ED NOTES
Paged intensivist PA and asked to place transfer to ICU order. ANGELA Valencia would like to see an ABG prior to accepting patient. Respiratory called. Primary RN and Nursing Supervisor aware.

## 2020-06-17 NOTE — PROGRESS NOTES
Problem: Diabetes Self-Management  Goal: *Disease process and treatment process  Description: Define diabetes and identify own type of diabetes; list 3 options for treating diabetes. Outcome: Progressing Towards Goal  Goal: *Incorporating nutritional management into lifestyle  Description: Describe effect of type, amount and timing of food on blood glucose; list 3 methods for planning meals. Outcome: Progressing Towards Goal  Goal: *Using medications safely  Description: State effect of diabetes medications on diabetes; name diabetes medication taking, action and side effects. Outcome: Progressing Towards Goal  Goal: *Monitoring blood glucose, interpreting and using results  Description: Identify recommended blood glucose targets  and personal targets. Outcome: Progressing Towards Goal     Pt continuing on insulin gtt. Tolerating well. Problem: Falls - Risk of  Goal: *Absence of Falls  Description: Document Leafy Carlos Fall Risk and appropriate interventions in the flowsheet. Outcome: Progressing Towards Goal  Pt remains free of falls during admission. Call bell and frequently used items within reach. Bedside table within reach. Pt provided nonskid socks and instructed to call out for nurse when needing assistance. 1800 Insulin gtt discontinued per MD orders. Lantus given two hours before turning off drip. Blood sugars to be monitored AC/HS. 1842 Arterial stick obtained for BMP. Verbal shift change report given to Karin RN (oncoming nurse) by Dior Pedersen RN (offgoing nurse). Report included the following information SBAR, Kardex, Intake/Output, Med Rec Status and Cardiac Rhythm sinus tach.

## 2020-06-17 NOTE — ED NOTES
Per Christel [place]. Pt is going to go to the [facility]. Shobha cotton. Attempted to call report.  Nurse unavailable - going to call back

## 2020-06-17 NOTE — ED NOTES
Verbal shift change report given to Magdy Jameson 70 (oncoming nurse) by Elmo Su RN (offgoing nurse). Report included the following information SBAR, ED Summary, Intake/Output, MAR and Recent Results.

## 2020-06-17 NOTE — PROGRESS NOTES
Hospitalist Progress Note  Baron Hernandez MD  Answering service: [phone] from in house phone      Date of Service:  2020  NAME:  Anson Cockayne  :  1995  MRN:  [id number]    Admission Summary:   25F p/w DKA  Interval history / Subjective:   Patient seen and examined at bedside, feels well, chatty, mother at bedside- given permission due to pt's disability-. Gap closing. COVID -ve. No reTest needed. Assessment & Plan:     #. DKA: euglycemic. Insulin gtt, COVID -ve. - once 2nd time gap closed on BMP will start long acting insulin and dc gtt in 2 hours. SSI, A1c 7.6.   - If acidosis is not improving with DKA treatment will start bicarb and consult nephrology    #. HypoKalemia: Acute, replace, monitor  #. HTN: chronic, stable, Home regimen, Monitor  #. SIRS with no source of infection: sepsis ruled out. #. Leucocytosis- improving with DKA treatment. No signs of sepsis/infections clinically. Dc abx. #. TBI with disability: can be anxious, this may explain her tachycardia. Monitor. Code status: Full  DVT prophylaxis: Lovenox  Care Plan discussed with: Patient/Family and Nurse  Disposition: TBD     Hospital Problems  Never Reviewed          Codes Class Noted POA    D[facility]abetic [facility]etoacidoses) ([facility] ICD-10-CM: E11.10  ICD-9-CM: 250.12  2020 Unknown            Review of Systems:   Pertinent items are mentioned in interval history. Vital Signs:    Last 24hrs VS reviewed since prior progress note.  Most recent are:  Visit Vitals  /78 (BP 1 Location: Right arm, BP Patient Position: At rest)   Pulse (!) 118   Temp 98.1 °F (36.7 °C)   Resp (!) 34   Ht 5' 1\" (1.549 m)   Wt 64.4 kg (141 lb 15.6 oz)   SpO2 100%   Breastfeeding No   BMI 26.83 kg/m²       No intake or output data in the 24 hours ending 20 1123     Physical Examination:   General:  Alert, oriented, No acute distress, pleasant BW  Card:  S1, S2 without murmurs, good peripheral perfusion  Resp:  No accessory muscle use, Good AE, no wheezes, no rhonchi  Abd:  Soft, non-tender, non-distended, BS+  Extremities:  No cyanosis or clubbing, no significant edema  Neuro:  Grossly normal, no focal neuro deficits, follows commands   Psych:  Good insight, AAOx3, not agitated. Data Review:    Review and/or order of clinical lab test  Review and/or order of tests in the radiology section of CPT  Review and/or order of tests in the medicine section of [facility]  Labs:     Recent Labs     06/17/20  0550 06/16/20  1642   WBC 14.9* 24.0*   HGB 11.5 13.9   HCT 38.4 47.5*    518*     Recent Labs     06/17/20  0550 06/16/20  2342 06/16/20  1815 06/16/20  1642   * 148* 144 138   K 2.4* 3.7 4.8 4.6   * 128* 122* 113*   CO2 12* 5* 6* 6*   BUN 10 13 16 17   CREA 0.68 0.77 0.75 0.94   * 156* 160* 183*   CA 9.6 9.0 9.1 10.5*   MG 2.0 2.2  --  2.6*   PHOS  --   --  3.9 5.5*     Recent Labs     06/16/20  1642   ALT 17   AP 86   TBILI 0.3   TP 10.2*   ALB 4.8   GLOB 5.4*     Recent Labs     06/17/20  0253 06/16/20  1642   INR 1.3* 1.1   PTP 13.5* 10.9   APTT 25.8 29.2      Recent Labs     06/16/20  1642   FERR 15      No results found for: FOL, RBCF   No results for input(s): PH, PCO2, PO2 in the last 72 hours. No results for input(s): CPK, CKNDX, TROIQ in the last 72 hours.     No lab exists for component: CPKMB  No results found for: CHOL, CHOLX, CHLST, CHOLV, HDL, HDLP, LDL, LDLC, DLDLP, TGLX, TRIGL, TRIGP, CHHD, CHHDX  Lab Results   Component Value Date/Time    Glucose (POC) 158 (H) 06/17/2020 10:47 AM    Glucose (POC) 148 (H) 06/17/2020 09:45 AM    Glucose (POC) 126 (H) 06/17/2020 08:22 AM    Glucose (POC) 142 (H) 06/17/2020 07:30 AM    Glucose (POC) 166 (H) 06/17/2020 06:29 AM     Lab Results   Component Value Date/Time    Color YELLOW/STRAW 06/16/2020 05:41 PM    Appearance CLEAR 06/16/2020 05:41 PM    Specific gravity 1.017 06/16/2020 05:41 PM    pH (UA) 5.0 06/16/2020 05:41 PM    Protein 30 (A) 06/16/2020 05:41 PM    Glucose >1,000 (A) 06/16/2020 05:41 PM    Ketone >80 (A) 06/16/2020 05:41 PM    Bilirubin Negative 06/16/2020 05:41 PM    Urobilinogen 0.2 06/16/2020 05:41 PM    Nitrites Negative 06/16/2020 05:41 PM    Leukocyte Esterase Negative 06/16/2020 05:41 PM    Epithelial cells FEW 06/16/2020 05:41 PM    Bacteria Negative 06/16/2020 05:41 PM    WBC 0-4 06/16/2020 05:41 PM    RBC 0-5 06/16/2020 05:41 PM     Medications Reviewed:     Current Facility-Administered Medications   Medication Dose Route Frequency    labetaloL (NORMODYNE;TRANDATE) injection 20 mg  20 mg IntraVENous Q10MIN PRN    dextrose 5% infusion  100 mL/hr IntraVENous CONTINUOUS    potassium chloride 10 mEq in 100 ml IVPB  10 mEq IntraVENous Q1H    dextrose 5% - 0.45% NaCl with KCl 20 mEq/L infusion  125 mL/hr IntraVENous CONTINUOUS    insulin lispro (HUMALOG) injection   SubCUTAneous TIDAC    glucose chewable tablet 16 g  4 Tab Oral PRN    glucagon (GLUCAGEN) injection 1 mg  1 mg IntraMUSCular PRN    dextrose 10% infusion 0-250 mL  0-250 mL IntraVENous PRN    insulin regular (NOVOLIN R, HUMULIN R) 100 Units in 0.9% sodium chloride 100 mL infusion  0-50 Units/hr IntraVENous TITRATE    acetaminophen (TYLENOL) tablet 650 mg  650 mg Oral Q6H PRN    Or    acetaminophen (TYLENOL) suppository 650 mg  650 mg Rectal Q6H PRN    cefepime (MAXIPIME) 2 g in 0.9% sodium chloride (MBP/ADV) 100 mL  2 g IntraVENous Q8H    sodium chloride (NS) flush 5-40 mL  5-40 mL IntraVENous Q8H    sodium chloride (NS) flush 5-40 mL  5-40 mL IntraVENous PRN    ondansetron (ZOFRAN) injection 4 mg  4 mg IntraVENous Q4H PRN    famotidine (PF) (PEPCID) 20 mg in 0.9% sodium chloride 10 mL injection  20 mg IntraVENous Q12H   ______________________________________________________________________  EXPECTED LENGTH OF STAY: - - -  ACTUAL LENGTH OF STAY:          1               Shlomo Ballard MD

## 2020-06-17 NOTE — PROGRESS NOTES
2330 Patient arrived on unit and being transferred into room 422 by nursing staff. Kash MILLER now on unit as well to assess patient. Provider states he does not believe patient needs central access since she has 2 peripheral sites accessed for infusions at this time and will not require extended or more aggressive therapy at the current time. ED nurse also gives recommendation that patient have central access due to limited peripheral IV access that was ultrasound guided. Orders given to reassess ABG at 0200 and check labs Q 6 hours while on insulin drip and bicarb infusion.

## 2020-06-17 NOTE — PROGRESS NOTES
I reviewed the most recent VBG of the patient  Patient's pH now is 6.98  Patient continues to be tachycardic, tachypneic, high risk for decompensation, I spoke with nursing supervisor and ANGELA Martínez is a part of the ICU admitting team  Tiffany Martínez will come and evaluate the patient  I feel that the patient will be better served in critical care setting

## 2020-06-17 NOTE — DIABETES MGMT
[facility]  CLINICAL NURSE SPECIALIST CONSULT  PROGRAM FOR DIABETES HEALTH    INITIAL NOTE    Presentation   Joslyn Irwin is a 26 y.o. female with a PMH of autism, TBI, and 8 year history of type two diabetes who had a 4-5 day history of poor PO intake, lethargy and polyria who [facility] and was found to have SGLT2 induced euglycemic DKA. She was started on the DKA protocol with fluids and insulin infusion and admitted to step-down intermediate care. Diabetes: Patient has a known 8 year history of type two diabetes, treated with metformin and jardiance PTA. Family history positive for diabetes (Dad has type two diabetes). Consulted by Provider for advanced diabetes nursing assessment and care, specifically related to   [x] Inpatient management strategy  [x] Home management assessment      Diabetes-related medical history  Acute complications  DKA  Neurological complications: None  Microvascular disease: None  Macrovascular disease: None      Diabetes medication history  Drug class Currently in use Discontinued Never used   Biguanide Metformin 1000 mg  BID     DDP-4 inhibitor       Sulfonylurea      Thiazolidinedione      GLP-1 RA      SGLT-2 inhibitors Jardiance 25 mg  6-8 months ago     Basal insulin      Fixed Dose  Combinations      Bolus insulin        Subjective   Admitting glucose 177  Anion gap 19  CO2 6  A1C 7.6%  Ph 6.98    Managed by Dr Cathy Lucero first saw her at the age of 16 prediabetes- diet controlled. At 13years old A1C elevated and started on metformin. Lives with Mom and sister. 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 Was started on Jardiance 6-8 months ago. Appetite, energy level low the past 5 days- had not missed any metformin or jardiance doses. Last metformin and Jardiance doses were yesterday morning.     Patient reports the following home diabetes self-care practices:  Eating pattern  [x] Breakfast Fruit, Bowl of eggs  [x] Lunch  [place], vegetable, tuna, pickles, eggs  [x] Dinner  Meat with 2-3 vegetables  [x] Beverages Water with crystal lite, coke/sprite zero    Monitoring pattern  [x] Breakfast 100-160    Taking medications pattern  [x] Consistent administration  [x] Affordable      Objective   Physical exam  General Alert, oriented and in acute distress/ill-appearing. Spoke with mother who was at the bedside. Vital Signs   Visit Vitals  /78 (BP 1 Location: Right arm, BP Patient Position: At rest)   Pulse (!) 118   Temp 98.1 °F (36.7 °C)   Resp (!) 34   Ht 5' 1\" (1.549 m)   Wt 64.4 kg (141 lb 15.6 oz)   SpO2 100%   Breastfeeding No   BMI 26.83 kg/m²     Deferred due to COVID-19 rule-out    Laboratory  Lab Results   Component Value Date/Time    Hemoglobin A1c 7.6 (H) 06/16/2020 06:15 PM     No results found for: LDL, LDLC, DLDLP  Lab Results   Component Value Date/Time    Creatinine 0.68 06/17/2020 05:50 AM     Lab Results   Component Value Date/Time    Sodium 148 (H) 06/17/2020 05:50 AM    Potassium 2.4 (LL) 06/17/2020 05:50 AM    Chloride 124 (H) 06/17/2020 05:50 AM    CO2 12 (LL) 06/17/2020 05:50 AM    Anion gap 12 06/17/2020 05:50 AM    Glucose 187 (H) 06/17/2020 05:50 AM    BUN 10 06/17/2020 05:50 AM    Creatinine 0.68 06/17/2020 05:50 AM    BUN/Creatinine ratio 15 06/17/2020 05:50 AM    GFR est AA >60 06/17/2020 05:50 AM    GFR est non-AA >60 06/17/2020 05:50 AM    Calcium 9.6 06/17/2020 05:50 AM    Bilirubin, total 0.3 06/16/2020 04:42 PM    Alk.  phosphatase 86 06/16/2020 04:42 PM    Protein, total 10.2 (H) 06/16/2020 04:42 PM    Albumin 4.8 06/16/2020 04:42 PM    Globulin 5.4 (H) 06/16/2020 04:42 PM    A-G Ratio 0.9 (L) 06/16/2020 04:42 PM    ALT (SGPT) 17 06/16/2020 04:42 PM     Lab Results   Component Value Date/Time    ALT (SGPT) 17 06/16/2020 04:42 PM       Blood glucose pattern        Assessment and Plan   Nursing Diagnosis Risk for unstable blood glucose pattern   Nursing Intervention Domain 6592 Decision-making Support   Nursing Interventions Examined current inpatient diabetes control   Explored factors facilitating and impeding inpatient management  Identified self-management practices impeding diabetes control  Explored corrective strategies with patient and responsible inpatient provider   Informed patient of rational for insulin strategy while hospitalized  Instructed mother in:  DKA  Normal blood glucose in DKA setting due to Jardiance  DKA protocol and plan for switching off insulin infusion  Need to follow up with endocrinologist     Evaluation   Gama Hanley is a 26 y.o. female with a PMH of autism, TBI, and 8 year history of type two diabetes who had a 4-5 day history of poor PO intake, lethargy and polyria who [facility] and was found to have SGLT2 induced euglycemic DKA. DKA was likely viral induced but patient did not  on signs and symptoms with a normal blood glucose- caused by the Jardiance. She was started on the DKA protocol with fluids and insulin infusion and admitted to step-down intermediate care. DKA is nearly resolved and she will transition off insulin infusion to basal, bolus insulin. Recommendations   Recommend:  Manage per DKA protocol  1. Insulin infusion until anion gap resolved x2 consecutive BMPs    Once anion gap resolved x2 please initiate the subcutaneous insulin order set with:  1. Low dose basal insulin: 0.2 units/kg/day. Turn off insulin infusion 2 hours later  2. Correctional insulin ACHS at normal sensitivity, start at a glucose of 200  3. POC glucose ACHS  4. Consistent Carbohydrate diet  5. Adjust IVF to non-dextrose containing fluids once insulin gtt discontinued    6.  Electrolyte replacement per primary team    On Discharge:  - Lewis Scales as she would be at risk for euglycemic DKA again  -FUV with Dr Hieu Gamboa at [facility] within 1 week  -Ok to resume Metformin 1000 mg BID on discharge          Billing Code(s)   Thank you for including us in their care. I spent 40 minutes in direct patient care today for this patient.   Time includes chart review, face to face with patient and collaboration with interdisciplinary care team.        Kyung Choi, CNS  Program for Diabetes Health  Access via [employer] [place] [phone]

## 2020-06-17 NOTE — ED NOTES
Current VBG reviewed with ED MD, Nelli Rivera and Dr. Melanie Post, the NP to come evaluate Pt.      Verbal order form Shobha to increase D5 to 200ml/hr

## 2020-06-17 NOTE — PROGRESS NOTES
Call placed to nursing supervisor Paras Banegas to make aware of patient's condition and acuity and voice concern about current level of care. Nursing supervisor states that we will treat and monitor patient as needed and can call RRT or call back to intensivist if patient's condition deteriorates.

## 2020-06-18 VITALS
WEIGHT: 141.98 LBS | HEIGHT: 61 IN | HEART RATE: 101 BPM | DIASTOLIC BLOOD PRESSURE: 85 MMHG | RESPIRATION RATE: 20 BRPM | TEMPERATURE: 98.4 F | BODY MASS INDEX: 26.81 KG/M2 | OXYGEN SATURATION: 99 % | SYSTOLIC BLOOD PRESSURE: 142 MMHG

## 2020-06-18 PROBLEM — E83.39 HYPOPHOSPHATEMIA: Status: ACTIVE | Noted: 2020-06-18

## 2020-06-18 PROBLEM — E87.6 HYPOKALEMIA: Status: ACTIVE | Noted: 2020-06-18

## 2020-06-18 LAB
ANION GAP SERPL CALC-SCNC: 8 MMOL/L (ref 5–15)
ANION GAP SERPL CALC-SCNC: 9 MMOL/L (ref 5–15)
BUN SERPL-MCNC: 6 MG/DL (ref 6–20)
BUN SERPL-MCNC: 7 MG/DL (ref 6–20)
BUN/CREAT SERPL: 12 (ref 12–20)
BUN/CREAT SERPL: 17 (ref 12–20)
CALCIUM SERPL-MCNC: 8.9 MG/DL (ref 8.5–10.1)
CALCIUM SERPL-MCNC: 9.2 MG/DL (ref 8.5–10.1)
CHLORIDE SERPL-SCNC: 114 MMOL/L (ref 97–108)
CHLORIDE SERPL-SCNC: 117 MMOL/L (ref 97–108)
CO2 SERPL-SCNC: 19 MMOL/L (ref 21–32)
CO2 SERPL-SCNC: 19 MMOL/L (ref 21–32)
CREAT SERPL-MCNC: 0.42 MG/DL (ref 0.55–1.02)
CREAT SERPL-MCNC: 0.51 MG/DL (ref 0.55–1.02)
EST. AVERAGE GLUCOSE BLD GHB EST-MCNC: 177 MG/DL
GLUCOSE BLD STRIP.AUTO-MCNC: 124 MG/DL (ref 65–100)
GLUCOSE BLD STRIP.AUTO-MCNC: 140 MG/DL (ref 65–100)
GLUCOSE BLD STRIP.AUTO-MCNC: 143 MG/DL (ref 65–100)
GLUCOSE SERPL-MCNC: 125 MG/DL (ref 65–100)
GLUCOSE SERPL-MCNC: 177 MG/DL (ref 65–100)
HBA1C MFR BLD: 7.8 % (ref 4–5.6)
MAGNESIUM SERPL-MCNC: 1.8 MG/DL (ref 1.6–2.4)
MAGNESIUM SERPL-MCNC: 1.9 MG/DL (ref 1.6–2.4)
PHOSPHATE SERPL-MCNC: 1.2 MG/DL (ref 2.6–4.7)
PHOSPHATE SERPL-MCNC: 2.6 MG/DL (ref 2.6–4.7)
POTASSIUM SERPL-SCNC: 3.3 MMOL/L (ref 3.5–5.1)
POTASSIUM SERPL-SCNC: 3.8 MMOL/L (ref 3.5–5.1)
SERVICE CMNT-IMP: ABNORMAL
SODIUM SERPL-SCNC: 141 MMOL/L (ref 136–145)
SODIUM SERPL-SCNC: 145 MMOL/L (ref 136–145)

## 2020-06-18 PROCEDURE — 84100 ASSAY OF PHOSPHORUS: CPT

## 2020-06-18 PROCEDURE — 74011250636 HC RX REV CODE- 250/636: Performed by: INTERNAL MEDICINE

## 2020-06-18 PROCEDURE — 74011250637 HC RX REV CODE- 250/637: Performed by: INTERNAL MEDICINE

## 2020-06-18 PROCEDURE — 74011636637 HC RX REV CODE- 636/637: Performed by: INTERNAL MEDICINE

## 2020-06-18 PROCEDURE — 80048 BASIC METABOLIC PNL TOTAL CA: CPT

## 2020-06-18 PROCEDURE — 83036 HEMOGLOBIN GLYCOSYLATED A1C: CPT

## 2020-06-18 PROCEDURE — 74011250636 HC RX REV CODE- 250/636: Performed by: NURSE PRACTITIONER

## 2020-06-18 PROCEDURE — 83735 ASSAY OF MAGNESIUM: CPT

## 2020-06-18 PROCEDURE — 74011250637 HC RX REV CODE- 250/637: Performed by: NURSE PRACTITIONER

## 2020-06-18 PROCEDURE — 36415 COLL VENOUS BLD VENIPUNCTURE: CPT

## 2020-06-18 PROCEDURE — 82962 GLUCOSE BLOOD TEST: CPT

## 2020-06-18 RX ORDER — POTASSIUM CHLORIDE 750 MG/1
40 TABLET, FILM COATED, EXTENDED RELEASE ORAL
Status: COMPLETED | OUTPATIENT
Start: 2020-06-18 | End: 2020-06-18

## 2020-06-18 RX ORDER — SODIUM,POTASSIUM PHOSPHATES 280-250MG
2 POWDER IN PACKET (EA) ORAL ONCE
Status: COMPLETED | OUTPATIENT
Start: 2020-06-18 | End: 2020-06-18

## 2020-06-18 RX ADMIN — SODIUM CHLORIDE 1000 ML: 900 INJECTION, SOLUTION INTRAVENOUS at 05:18

## 2020-06-18 RX ADMIN — INSULIN LISPRO 2 UNITS: 100 INJECTION, SOLUTION INTRAVENOUS; SUBCUTANEOUS at 06:58

## 2020-06-18 RX ADMIN — SODIUM CHLORIDE 1000 ML: 900 INJECTION, SOLUTION INTRAVENOUS at 11:44

## 2020-06-18 RX ADMIN — Medication 10 ML: at 05:16

## 2020-06-18 RX ADMIN — Medication 10 ML: at 14:00

## 2020-06-18 RX ADMIN — POTASSIUM CHLORIDE 40 MEQ: 750 TABLET, FILM COATED, EXTENDED RELEASE ORAL at 07:01

## 2020-06-18 RX ADMIN — POTASSIUM CHLORIDE 40 MEQ: 750 TABLET, FILM COATED, EXTENDED RELEASE ORAL at 05:39

## 2020-06-18 RX ADMIN — INSULIN GLARGINE 10 UNITS: 100 INJECTION, SOLUTION SUBCUTANEOUS at 09:30

## 2020-06-18 RX ADMIN — POTASSIUM & SODIUM PHOSPHATES POWDER PACK 280-160-250 MG 2 PACKET: 280-160-250 PACK at 12:23

## 2020-06-18 RX ADMIN — Medication 10 ML: at 00:37

## 2020-06-18 RX ADMIN — INSULIN LISPRO 2 UNITS: 100 INJECTION, SOLUTION INTRAVENOUS; SUBCUTANEOUS at 12:22

## 2020-06-18 NOTE — DISCHARGE INSTRUCTIONS
Diabetes Management:  1. Take a blood glucose reading four times daily:  First thing in the morning before you eat or drink anything. Then before lunch, dinner and bedtime. Make a log and bring to your next doctor appointment. 2. Resume your metformin 24 hours after your last Lantus dose. 3. Make a follow up appointment with your endocrinologist.  Please see them within the next 1-2 weeks. 4. Make sure to get a DILATED eye exam every year. This checks for retinal blood vessel damage caused by long term high blood sugar. 5. Make sure to examine your feet every day looking for any wound or foot irritation as sensation can be impaired in your feet. Always wear socks and/or slippers even while at home. This will protect your feet from injury. 6. Diabetes Self Management Training:  Outpatient appointments are available with a certified diabetic educator who can  you with meal planning, insulin administration and other diabetes management strategies. Please call [phone] to schedule at a location close to your home. Dear Juwan Nuno,    Thank you for choosing [address] [place] [address] for your healthcare needs. We strive to provide EXCELLENT care to you and your family. In an effort to explain clearly why you were here in the hospital, I've also written a very brief summary below. Other details including formal diagnosis, medication changes, and follow up appointment recommendations can also be found in this packet. You were admitted for shortness of breath due to diabetic ketoacidosis for which you were started on insulin drip. Here are the updates to your medication list:  - STOP Jardiance (empagliflozin)    Remember that it is important for you to take your medications exactly as they are prescribed. It is helpful to keep a list of your medication with the names, dosages, and times to be taken in your wallet.      Additionally,   - Please make sure to follow up with your primary care physician within 1-2 weeks of discharge for hospital follow up. You also need to follow up with Dr. Rosalio Batista in about 1 week. - Please make sure to continue to monitor for: Repeat symptoms, including shortness of breath, high heart rate, changes in mental status and return to the ED with any of these signs.  -Your heart rate has remained elevated in the hospital, please make sure to follow-up on this with your primary care physician. If this remains elevated, you may need to go on a medication to help reduce that heart rate.  -Stay well hydrated and drink plenty of water. - Please get up slowly from a seated or laying position, avoid falls. - Avoid tobacco, alcohol and other illicit drug use. Make sure to also see your primary care doctor for follow-up. Bring these papers with you and be sure to review your medication list with your doctor. I cannot stress the importance of follow up enough. I've included the information for your follow-up appointments below: Follow-up Information     Follow up With Specialties Details Why Contact Info    Monica Main MD Pediatric Endocrinology Schedule an appointment as soon as possible for a visit in 1 week Please schedule a follow-up appointment with Dr Rosalio Batista to discuss Diabetes medications [address]  [phone]      Brooks Bustillos MD Family Practice In 1 week  [facility] [address] [phone]            At this time, the following test results are still pending: None Again, please follow-up these results with your primary care provider. Should you have any fever over 101 degrees for 24 hours, chest pain, shortness of breath, fever, chills, nausea, vomiting, diarrhea, change in mentation, falling, weakness, bleeding, or worsening pain, please seek medical attention immediately. Finally, as your discharging physician, you may be receiving a survey regarding my care.  I would greatly value and appreciate your input in the survey as we strive for excellence. If you have any questions, I can be reached at [phone].   Thank you so much again for allowing me to care for you at [facility].    Respectfully yours,  Raymond Freeman MD

## 2020-06-18 NOTE — DISCHARGE SUMMARY
Discharge Summary       PATIENT ID: Shanelle Lan  MRN: [id number]   YOB: 1995    DATE OF ADMISSION: 6/16/2020  4:26 PM    DATE OF DISCHARGE: 06/18/2020  PRIMARY CARE PROVIDER: Jewels Blancas MD     DISCHARGING PROVIDER: Margaret Lea MD    To contact this individual call [phone] and ask the  to page. If unavailable ask to be transferred the Adult Hospitalist Department. CONSULTATIONS: None    PROCEDURES/SURGERIES: * No surgery found *    ADMITTING DIAGNOSES & HOSPITAL COURSE:   Euglycemic DKA - secondary to Jardiance   Hypokalemia - secondary to above    Ms. Coral Grday is a 23-year-old female with past medical history of autism spectrum disorder, TBI, type 2 diabetes, who presented with shortness of breath. [facility] she had some laboratory data drawn which showed significant DKA. She was then transferred to the emergency room. Patient also has significant tachycardia since admission with heart rates in the 160s. The patient was then started on a insulin drip, with significant improvement. Her electrolytes were monitored and were repleted. Her DKA was presumed secondary to [place], and was euglycemic. Given that her hemoglobin A1c 7.6, she was not continued on insulin on discharge. She should follow-up with her endocrinologist in 1 week, and resume metformin. DISCHARGE DIAGNOSES / PLAN:      #. DKA: Significant metabolic acidosis. Euglycemic. Secondary to Jardiance hemoglobin A1c 7.6%  - s/p Insulin gtt, then was transitioned to long-acting insulin while in-house. - COVID -ve. -Acidosis improved with treatment of the DKA  -Hold Jardiance on discharge. She may resume metformin, no need for insulin long-term     #. HypoKalemia: Acute, replace, monitor  #. HTN: chronic, stable, Home regimen, Monitor  #. SIRS with no source of infection: sepsis ruled out. #. Leucocytosis- improving with DKA treatment.  No signs of sepsis/infections clinically. Dc abx. #. TBI with disability: can be anxious, this may explain her tachycardia. Monitor. ADDITIONAL CARE RECOMMENDATIONS:   - Please take all medications as prescribed. Note changes as below. ** STOP jardiance. ** You may resume metformin   - Please make sure to follow up with your primary care physician within 1-2 weeks of discharge for hospital follow up. You also need to follow up with Dr. Jacquelin Dobbs in about 1 week. - Please make sure to continue to monitor for: Repeat symptoms, including shortness of breath, high heart rate, changes in mental status and return to the ED with any of these signs.  -Your heart rate has remained elevated in the hospital, please make sure to follow-up on this with your primary care physician. If this remains elevated, you may need to go on a medication to help reduce that heart rate.  -Stay well hydrated and drink plenty of water. - Please get up slowly from a seated or laying position, avoid falls. - Avoid tobacco, alcohol and other illicit drug use. PENDING TEST RESULTS:   At the time of discharge the following test results are still pending: None    FOLLOW UP APPOINTMENTS:    Follow-up Information     Follow up With Specialties Details Why Contact Info    Viraj Storey MD Pediatric Endocrinology Schedule an appointment as soon as possible for a visit in 1 week Please schedule a follow-up appointment with Dr Jacquelin Dobbs to discuss Diabetes medications [address] [address]  [address]  [phone]               DIET: Diabetic Diet and Resume previous diet    ACTIVITY: Activity as tolerated    WOUND CARE: None    EQUIPMENT needed: None      DISCHARGE MEDICATIONS:  Current Discharge Medication List      CONTINUE these medications which have NOT CHANGED    Details   metFORMIN (GLUCOPHAGE) 1,000 mg tablet Take 1,000 mg by mouth two (2) times daily (with meals).       cholecalciferol (Vitamin D3) (1000 Units /25 mcg) tablet Take 1,000 Units by mouth daily. diphenhydrAMINE (BENADRYL) 25 mg capsule Take 25 mg by mouth nightly as needed. cetirizine (ZyrTEC) 10 mg tablet Take 10 mg by mouth daily as needed for Allergies. STOP taking these medications       empagliflozin (Jardiance) 25 mg tablet Comments:   Reason for Stopping:                 NOTIFY YOUR PHYSICIAN FOR ANY OF THE FOLLOWING:   Fever over 101 degrees for 24 hours. Chest pain, shortness of breath, fever, chills, nausea, vomiting, diarrhea, change in mentation, falling, weakness, bleeding. Severe pain or pain not relieved by medications. Or, any other signs or symptoms that you may have questions about. DISPOSITION:   X Home With:   OT  PT  HH  RN       Long term SNF/Inpatient Rehab    Independent/assisted living    Hospice    Other:       PATIENT CONDITION AT DISCHARGE:     Functional status    Poor     Deconditioned    X Independent      Cognition   X  Lucid     Forgetful     Dementia      Catheters/lines (plus indication)    Baig     PICC     PEG    X None      Code status   X  Full code     DNR      PHYSICAL EXAMINATION AT DISCHARGE:  General:          Alert, cooperative, no distress, appears stated age. HEENT:           Atraumatic, anicteric sclerae, pink conjunctivae                          No oral ulcers, mucosa moist, throat clear, dentition fair  Lungs:             Clear to auscultation bilaterally. No Wheezing or Rhonchi. No rales. Chest wall:      No tenderness  No Accessory muscle use. Heart:              Regular  rhythm,  No  murmur   No edema  Abdomen:        Soft, non-tender. Not distended. Bowel sounds normal  Extremities:     No cyanosis. No clubbing,                            Skin turgor normal, Capillary refill normal  Skin:                Not pale.   Not Jaundiced  No rashes   Psych:             + anxious   Neurologic:      Alert, moves all extremities, answers questions appropriately and responds to commands       [address] DIAGNOSES:  Problem List as of 6/18/2020 Date Reviewed: 6/18/2020          Codes Class Noted - Resolved    Hypokalemia ICD-10-CM: E87.6  ICD-9-CM: 276.8  6/18/2020 - Present        Hypophosphatemia ICD-10-CM: E83.39  ICD-9-CM: 275.3  6/18/2020 - Present        DKA (diabetic ketoacidoses) ([facility] ICD-10-CM: E11.10  ICD-9-CM: 250.12  6/16/2020 - Present              Greater than 30 minutes were spent with the patient on counseling and coordination of care    Signed:   Janene Kc MD  6/18/2020  1:41 PM

## 2020-06-18 NOTE — PROGRESS NOTES
Bedside shift change report given to Dominick Santos RN (oncoming nurse) by Shane Buchanan RN (offgoing nurse). Report included the following information SBAR, Kardex, Intake/Output and MAR.

## 2020-06-18 NOTE — PROGRESS NOTES
6201 Tiffanie Sargent called report on pt. Receiving RN noted no VS had been taken on patient since 1846 with a MEWS of 6, /91 and . VS had MEWS of 5-6 since 1142 with elevated HR and BP. RN noted to [facility] RN that the patients VS needed to be reassessed and BP meds needs to be given prior to pt coming to unit. Also no shift assessment had been documented since 0830. Approximately 2300, RN noted pts BP of 122/100 and . MEWS of 4. No BP meds administered. Nursing supervisor notified of pt MEWs score and pts current VS were not stable to be transferred at this time. Nurse Supervisor said she would take a look at this pt and call back. RN called [facility] nurse Freddy to let him know that the patients BP and HR was still elevated and to see if they gave any BP meds prior to trasnferring pt. Nurse stated he did not give labetalol due to pt not meeting criteria of /90. As pt was coming down the marie, RN stopped transport because pt might have to return back to [facility]. Called nursing supervisors back, to see what they wanted to do. RN restated that this pt is not stable for this unit due to elevated   and /100, MEWS of 4. Nursing supervisors stated PT is stable and to place patient in the room for now. Pt mothers is unhappy due to patient not being placed in room immediately and the overall situation. 2310 TRANSFER - IN REPORT:    Verbal report received from [facility] RN(name) on Kathy Pagan  being received from [facility]) for routine progression of care      Report consisted of patients Situation, Background, Assessment and   Recommendations(SBAR). Information from the following report(s) SBAR, Kardex, Intake/Output, MAR and Recent Results was reviewed with the receiving nurse. Opportunity for questions and clarification was provided. Assessment completed upon patients arrival to unit and care assumed. 2316- Hospitalist on call notified about pts elevated BP and HR. Hospitalist ordered remote tele. Patient is stable, AOx4, on Room Air. Mother is present at beside and unhappy. Patients mother, who is primary caretaker,told RN she needs to direct questions and care towards her and not the patient. Bedside and Verbal shift change report given to [facility] (oncoming nurse) by Maddy Garcia RN (offgoing nurse). Report included the following information SBAR, Kardex, MAR and Recent Results.

## 2020-06-18 NOTE — ROUTINE PROCESS
[address] Nurse stopped pct transport, patient and family in the hallway. Charge nurse said that we had to take the patient back to [facility] and also discussed the patient medical status while outside the patient's room. Charge Rn called nursing supervisor. Pt allowed to enter room. Family very unhappy with situation.

## 2020-06-18 NOTE — DIABETES MGMT
CRISTINA DHILLON  CLINICAL NURSE SPECIALIST CONSULT  PROGRAM FOR DIABETES HEALTH    FOLLOW-UP NOTE    Presentation   Carlos Perez is a 26 y.o. female with a PMH of autism, TBI, and 8 year history of type two diabetes who had a 4-5 day history of poor PO intake, lethargy and polyria who [facility] and was found to have SGLT2 induced euglycemic DKA. She was started on the DKA protocol with fluids and insulin infusion and admitted to step-down intermediate care. Subjective   I was so sick for two days and today I feel back to normal.  DKA resolved: Transitioned to basal and correctional insulin  COVID -  Glucose 102-161    Objective   Physical exam  General Alert, oriented and in no acute distress/ill-appearing. Conversant and cooperative. Vital Signs   Visit Vitals  /74   Pulse (!) 121   Temp 98.2 °F (36.8 °C)   Resp 16   Ht 5' 1\" (1.549 m)   Wt 64.4 kg (141 lb 15.6 oz)   SpO2 100%   Breastfeeding No   BMI 26.83 kg/m²     Skin  Warm and dry  Heart   Regular rate and rhythm. No murmurs, rubs or gallops  Lungs  Clear to auscultation without rales or rhonchi  Extremities No foot wounds    Laboratory  Lab Results   Component Value Date/Time    Hemoglobin A1c 7.8 (H) 06/18/2020 03:25 AM     No results found for: LDL, LDLC, DLDLP  Lab Results   Component Value Date/Time    Creatinine 0.51 (L) 06/18/2020 03:25 AM     Lab Results   Component Value Date/Time    Sodium 145 06/18/2020 03:25 AM    Potassium 3.3 (L) 06/18/2020 03:25 AM    Chloride 117 (H) 06/18/2020 03:25 AM    CO2 19 (L) 06/18/2020 03:25 AM    Anion gap 9 06/18/2020 03:25 AM    Glucose 125 (H) 06/18/2020 03:25 AM    BUN 6 06/18/2020 03:25 AM    Creatinine 0.51 (L) 06/18/2020 03:25 AM    BUN/Creatinine ratio 12 06/18/2020 03:25 AM    GFR est AA >60 06/18/2020 03:25 AM    GFR est non-AA >60 06/18/2020 03:25 AM    Calcium 8.9 06/18/2020 03:25 AM    Bilirubin, total 0.3 06/16/2020 04:42 PM    Alk.  phosphatase 86 06/16/2020 04:42 PM    Protein, total 10.2 (H) 06/16/2020 04:42 PM    Albumin 4.8 06/16/2020 04:42 PM    Globulin 5.4 (H) 06/16/2020 04:42 PM    A-G Ratio 0.9 (L) 06/16/2020 04:42 PM    ALT (SGPT) 17 06/16/2020 04:42 PM     Lab Results   Component Value Date/Time    ALT (SGPT) 17 06/16/2020 04:42 PM       Blood glucose pattern      Assessment and Plan   Nursing Diagnosis Risk for unstable blood glucose pattern   Nursing Intervention Domain 5259 Decision-making Support   Nursing Interventions Examined current inpatient diabetes control   Explored factors facilitating and impeding inpatient management     Evaluation   Jessica Hwang is a 26 y.o. female with a PMH of autism, TBI, and 8 year history of type two diabetes who had a 4-5 day history of poor PO intake, lethargy and polyria who [facility] and was found to have SGLT2 induced euglycemic DKA. DKA was likely viral induced but patient did not  on signs and symptoms with a normal blood glucose- caused by the Jardiance. She was started on the DKA protocol with fluids and insulin infusion and admitted to step-down intermediate care. DKA has resolved and she has transitioned off insulin infusion to basal and correctional insulin. Blood glucose in goal at this time of 100-180. Recommendations   Recommend:  1. Continue Lantus 10 units Daily    2. Continue correctional insulin at normal sensitivity    3. Continue consistent carbohydrate diet     Discharge:  - Elizabeth Juárez as she would be at risk for euglycemic DKA again  -FUV with Dr Jacquelin Dobbs at [facility] endocrinology within 1 week  -[id number] to resume Metformin 1000 mg BID on discharge, first dose 24 hours after last Lantus dose  -Patient to continue blood glucose monitoring ACHS    Billing Code(s)     Thank you for including us in their care. I spent 25 minutes in direct patient care today for this patient.   Time includes chart review, face to face with patient and collaboration with interdisciplinary care team.      Eloise Serve, CNS  Access via R Hannah Covarrubias [phone]

## 2020-06-19 ENCOUNTER — TELEPHONE (OUTPATIENT)
Dept: INTERNAL MEDICINE CLINIC | Age: 25
End: 2020-06-19

## 2020-06-19 NOTE — PROGRESS NOTES
Discharge instructions reviewed with patient and mother. Understanding good. Patient stated she felt good  enough to go home. No complaints.

## 2020-06-19 NOTE — TELEPHONE ENCOUNTER
20 2:23 PM--attempted to reach pt or her mother at the one phone # in demographics but they are not answering the phone at this time. Pt's mother has a personal greeting in , so message was left introducing myself and the purpose of my call. My phone # was left in message for her return call. 20 4:08 PM    Patient contacted regarding recent discharge and COVID-19 risk. Discussed COVID-19 related testing which was available at this time. Test results were negative. Patient informed of results, if available? yes    Care Transition Nurse/ Ambulatory Care Manager contacted the family by telephone to perform post discharge assessment. Verified name and  with family as identifiers. Patient has following risk factors of: diabetes. CTN/ACM reviewed discharge instructions, medical action plan and red flags related to discharge diagnosis. Reviewed and educated them on any new and changed medications related to discharge diagnosis. Mother states that patient has stopped Jardiance and resumed Metformin as recommended at D/C. She states pt has F/U with endocrinology this Friday and with PCP on --the earliest appt time that was available, per mother. Education provided regarding infection prevention, and signs and symptoms of COVID-19 and when to seek medical attention with family who verbalized understanding. Discussed exposure protocols and quarantine from [address] you at higher risk for severe illness  and given an opportunity for questions and concerns. The family agrees to contact the COVID-19 hotline [phone] or PCP office for questions related to their healthcare. CTN/ACM provided contact information for future reference. From CDC: Are you at higher risk for severe illness?  Wash your hands often.  Avoid close contact (6 feet, which is about two arm lengths) with people who are sick.    Put distance between yourself and other people if COVID-19 is spreading in your community.  Clean and disinfect frequently touched surfaces.  Avoid all cruise travel and non-essential air travel.  Call your healthcare professional if you have concerns about COVID-19 and your underlying condition or if you are sick. For more information on steps you can take to protect yourself, see CDC's How to Protect Yourself      Asked mother to encourage pt to activate MyChart and with mother's permission, I've sent an email invitation to pt. Advised that if pt activates MyChart in the next week or so, I'll send our COVID-19 information and phone resources to her via the portal.    Mother confirmed health care decision maker information as correct and current. Patient/family/caregiver given information for [employer] and agrees to enroll no, mother doesn't think pt would like to participate  Patient's preferred e-mail:  N/A  Patient's preferred phone number: N/A  Based on Loop alert triggers, patient will be contacted by nurse care manager for worsening symptoms. Plan for follow-up call in 5-7 days based on severity of symptoms and risk factors.

## 2020-06-21 LAB
BACTERIA SPEC CULT: NORMAL
SERVICE CMNT-IMP: NORMAL

## 2020-06-30 ENCOUNTER — TELEPHONE (OUTPATIENT)
Dept: INTERNAL MEDICINE CLINIC | Age: 25
End: 2020-06-30

## 2020-06-30 NOTE — TELEPHONE ENCOUNTER
6/30/20 4:18 PM     Patient resolved from Transition of Care episode on 6/30/20. Discussed COVID-19 related testing which was available at this time. Test results were negative. Patient informed of results, if available? yes     Patient/family has been provided the following resources and education related to COVID-19:                         Signs, symptoms and red flags related to COVID-19            [facility] exposure and quarantine guidelines            [employer] exposure contact - [phone]            Contact for their local Department of Health                 Patient currently reports that the following symptoms have improved:  mother states patient is doing better now. No further outreach scheduled with this CTN/ACM/LPN/HC/ MA. Episode of Care resolved. Patient has this CTN/ACM/LPN/HC/MA contact information if future needs arise.

## 2022-03-19 PROBLEM — E87.6 HYPOKALEMIA: Status: ACTIVE | Noted: 2020-06-18

## 2022-03-19 PROBLEM — E83.39 HYPOPHOSPHATEMIA: Status: ACTIVE | Noted: 2020-06-18

## 2023-05-22 RX ORDER — CETIRIZINE HYDROCHLORIDE 10 MG/1
10 TABLET ORAL DAILY PRN
COMMUNITY

## 2023-05-22 RX ORDER — DIPHENHYDRAMINE HCL 25 MG
25 CAPSULE ORAL
COMMUNITY

## 2024-05-20 ENCOUNTER — APPOINTMENT (OUTPATIENT)
Facility: HOSPITAL | Age: 29
End: 2024-05-20
Payer: COMMERCIAL

## 2024-05-20 ENCOUNTER — HOSPITAL ENCOUNTER (INPATIENT)
Facility: HOSPITAL | Age: 29
LOS: 4 days | Discharge: HOME | End: 2024-05-24
Attending: STUDENT IN AN ORGANIZED HEALTH CARE EDUCATION/TRAINING PROGRAM | Admitting: FAMILY MEDICINE
Payer: COMMERCIAL

## 2024-05-20 DIAGNOSIS — E16.2 HYPOGLYCEMIA: ICD-10-CM

## 2024-05-20 DIAGNOSIS — R07.9 CHEST PAIN, UNSPECIFIED TYPE: ICD-10-CM

## 2024-05-20 DIAGNOSIS — T68.XXXA HYPOTHERMIA, INITIAL ENCOUNTER: ICD-10-CM

## 2024-05-20 DIAGNOSIS — A41.9 SEPSIS, DUE TO UNSPECIFIED ORGANISM, UNSPECIFIED WHETHER ACUTE ORGAN DYSFUNCTION PRESENT (HCC): Primary | ICD-10-CM

## 2024-05-20 LAB
AMPHET UR QL SCN: NEGATIVE
ANION GAP SERPL CALC-SCNC: 10 MMOL/L (ref 5–15)
APPEARANCE UR: ABNORMAL
BACTERIA URNS QL MICRO: NEGATIVE /HPF
BARBITURATES UR QL SCN: NEGATIVE
BASE DEFICIT BLD-SCNC: 2.6 MMOL/L
BASOPHILS # BLD: 0 K/UL (ref 0–0.1)
BASOPHILS NFR BLD: 0 % (ref 0–1)
BENZODIAZ UR QL: NEGATIVE
BILIRUB UR QL: NEGATIVE
BUN SERPL-MCNC: 11 MG/DL (ref 6–20)
BUN/CREAT SERPL: 23 (ref 12–20)
CA-I BLD-MCNC: 1.19 MMOL/L (ref 1.12–1.32)
CA-I BLD-MCNC: 9.2 MG/DL (ref 8.5–10.1)
CANNABINOIDS UR QL SCN: NEGATIVE
CHLORIDE BLD-SCNC: 106 MMOL/L (ref 98–107)
CHLORIDE SERPL-SCNC: 109 MMOL/L (ref 97–108)
CO2 BLD-SCNC: 22 MMOL/L
CO2 SERPL-SCNC: 22 MMOL/L (ref 21–32)
COCAINE UR QL SCN: NEGATIVE
COLOR UR: ABNORMAL
CREAT SERPL-MCNC: 0.47 MG/DL (ref 0.55–1.02)
CREAT UR-MCNC: 0.37 MG/DL (ref 0.6–1.3)
DIFFERENTIAL METHOD BLD: ABNORMAL
EOSINOPHIL # BLD: 0 K/UL (ref 0–0.4)
EOSINOPHIL NFR BLD: 0 % (ref 0–7)
ERYTHROCYTE [DISTWIDTH] IN BLOOD BY AUTOMATED COUNT: 13 % (ref 11.5–14.5)
GLUCOSE BLD STRIP.AUTO-MCNC: 126 MG/DL (ref 65–100)
GLUCOSE BLD STRIP.AUTO-MCNC: 159 MG/DL (ref 65–100)
GLUCOSE BLD STRIP.AUTO-MCNC: 224 MG/DL (ref 65–100)
GLUCOSE BLD STRIP.AUTO-MCNC: 227 MG/DL (ref 65–100)
GLUCOSE BLD STRIP.AUTO-MCNC: 254 MG/DL (ref 65–100)
GLUCOSE BLD STRIP.AUTO-MCNC: 31 MG/DL (ref 65–100)
GLUCOSE SERPL-MCNC: 113 MG/DL (ref 65–100)
GLUCOSE UR STRIP.AUTO-MCNC: >500 MG/DL
HCO3 BLD-SCNC: 22.5 MMOL/L (ref 19–28)
HCT VFR BLD AUTO: 36.6 % (ref 35–47)
HGB BLD-MCNC: 11.6 G/DL (ref 11.5–16)
HGB UR QL STRIP: ABNORMAL
IMM GRANULOCYTES # BLD AUTO: 0.1 K/UL (ref 0–0.04)
IMM GRANULOCYTES NFR BLD AUTO: 0 % (ref 0–0.5)
KETONES UR QL STRIP.AUTO: 5 MG/DL
LACTATE BLD-SCNC: 3.68 MMOL/L (ref 0.4–2)
LEUKOCYTE ESTERASE UR QL STRIP.AUTO: ABNORMAL
LYMPHOCYTES # BLD: 1.3 K/UL (ref 0.8–3.5)
LYMPHOCYTES NFR BLD: 8 % (ref 12–49)
Lab: NORMAL
MCH RBC QN AUTO: 24.1 PG (ref 26–34)
MCHC RBC AUTO-ENTMCNC: 31.7 G/DL (ref 30–36.5)
MCV RBC AUTO: 76.1 FL (ref 80–99)
METHADONE UR QL: NEGATIVE
MONOCYTES # BLD: 0.5 K/UL (ref 0–1)
MONOCYTES NFR BLD: 3 % (ref 5–13)
MUCOUS THREADS URNS QL MICRO: ABNORMAL /LPF
NEUTS SEG # BLD: 14.2 K/UL (ref 1.8–8)
NEUTS SEG NFR BLD: 89 % (ref 32–75)
NITRITE UR QL STRIP.AUTO: NEGATIVE
NRBC # BLD: 0 K/UL (ref 0–0.01)
NRBC BLD-RTO: 0 PER 100 WBC
OPIATES UR QL: NEGATIVE
PCO2 BLD: 39 MMHG (ref 35–45)
PCP UR QL: NEGATIVE
PERFORMED BY:: ABNORMAL
PH BLD: 7.37 (ref 7.35–7.45)
PH UR STRIP: 5 (ref 5–8)
PLATELET # BLD AUTO: 429 K/UL (ref 150–400)
PMV BLD AUTO: 9.4 FL (ref 8.9–12.9)
PO2 BLD: 45 MMHG (ref 75–100)
POTASSIUM BLD-SCNC: 3.9 MMOL/L (ref 3.5–5.5)
POTASSIUM SERPL-SCNC: 2.8 MMOL/L (ref 3.5–5.1)
PROT UR STRIP-MCNC: NEGATIVE MG/DL
RBC # BLD AUTO: 4.81 M/UL (ref 3.8–5.2)
RBC #/AREA URNS HPF: ABNORMAL /HPF (ref 0–5)
SAO2 % BLD: 80 %
SERVICE CMNT-IMP: ABNORMAL
SODIUM BLD-SCNC: 139 MMOL/L (ref 136–145)
SODIUM SERPL-SCNC: 141 MMOL/L (ref 136–145)
SP GR UR REFRACTOMETRY: 1.01 (ref 1–1.03)
SPECIMEN SITE: ABNORMAL
TROPONIN I SERPL HS-MCNC: <4 NG/L (ref 0–51)
TSH SERPL DL<=0.05 MIU/L-ACNC: 0.44 UIU/ML (ref 0.36–3.74)
UROBILINOGEN UR QL STRIP.AUTO: 0.1 EU/DL (ref 0.1–1)
WBC # BLD AUTO: 16.1 K/UL (ref 3.6–11)
WBC URNS QL MICRO: ABNORMAL /HPF (ref 0–4)

## 2024-05-20 PROCEDURE — 80061 LIPID PANEL: CPT

## 2024-05-20 PROCEDURE — 6360000002 HC RX W HCPCS

## 2024-05-20 PROCEDURE — 80307 DRUG TEST PRSMV CHEM ANLYZR: CPT

## 2024-05-20 PROCEDURE — 87086 URINE CULTURE/COLONY COUNT: CPT

## 2024-05-20 PROCEDURE — 71045 X-RAY EXAM CHEST 1 VIEW: CPT

## 2024-05-20 PROCEDURE — 82962 GLUCOSE BLOOD TEST: CPT

## 2024-05-20 PROCEDURE — 96365 THER/PROPH/DIAG IV INF INIT: CPT

## 2024-05-20 PROCEDURE — 2580000003 HC RX 258: Performed by: STUDENT IN AN ORGANIZED HEALTH CARE EDUCATION/TRAINING PROGRAM

## 2024-05-20 PROCEDURE — 84145 PROCALCITONIN (PCT): CPT

## 2024-05-20 PROCEDURE — 6360000002 HC RX W HCPCS: Performed by: FAMILY MEDICINE

## 2024-05-20 PROCEDURE — 80047 BASIC METABLC PNL IONIZED CA: CPT

## 2024-05-20 PROCEDURE — 99285 EMERGENCY DEPT VISIT HI MDM: CPT

## 2024-05-20 PROCEDURE — 81003 URINALYSIS AUTO W/O SCOPE: CPT

## 2024-05-20 PROCEDURE — 84132 ASSAY OF SERUM POTASSIUM: CPT

## 2024-05-20 PROCEDURE — 85025 COMPLETE CBC W/AUTO DIFF WBC: CPT

## 2024-05-20 PROCEDURE — 6360000002 HC RX W HCPCS: Performed by: STUDENT IN AN ORGANIZED HEALTH CARE EDUCATION/TRAINING PROGRAM

## 2024-05-20 PROCEDURE — 2580000003 HC RX 258: Performed by: FAMILY MEDICINE

## 2024-05-20 PROCEDURE — 84484 ASSAY OF TROPONIN QUANT: CPT

## 2024-05-20 PROCEDURE — 82330 ASSAY OF CALCIUM: CPT

## 2024-05-20 PROCEDURE — 87040 BLOOD CULTURE FOR BACTERIA: CPT

## 2024-05-20 PROCEDURE — 84443 ASSAY THYROID STIM HORMONE: CPT

## 2024-05-20 PROCEDURE — 93005 ELECTROCARDIOGRAM TRACING: CPT | Performed by: FAMILY MEDICINE

## 2024-05-20 PROCEDURE — 1100000000 HC RM PRIVATE

## 2024-05-20 PROCEDURE — 82803 BLOOD GASES ANY COMBINATION: CPT

## 2024-05-20 PROCEDURE — 83735 ASSAY OF MAGNESIUM: CPT

## 2024-05-20 PROCEDURE — 83036 HEMOGLOBIN GLYCOSYLATED A1C: CPT

## 2024-05-20 PROCEDURE — 83605 ASSAY OF LACTIC ACID: CPT

## 2024-05-20 PROCEDURE — 80048 BASIC METABOLIC PNL TOTAL CA: CPT

## 2024-05-20 PROCEDURE — 84100 ASSAY OF PHOSPHORUS: CPT

## 2024-05-20 PROCEDURE — 96375 TX/PRO/DX INJ NEW DRUG ADDON: CPT

## 2024-05-20 PROCEDURE — 82947 ASSAY GLUCOSE BLOOD QUANT: CPT

## 2024-05-20 PROCEDURE — 84295 ASSAY OF SERUM SODIUM: CPT

## 2024-05-20 PROCEDURE — 36415 COLL VENOUS BLD VENIPUNCTURE: CPT

## 2024-05-20 RX ORDER — ENOXAPARIN SODIUM 100 MG/ML
40 INJECTION SUBCUTANEOUS DAILY
Status: DISCONTINUED | OUTPATIENT
Start: 2024-05-21 | End: 2024-05-24 | Stop reason: HOSPADM

## 2024-05-20 RX ORDER — 0.9 % SODIUM CHLORIDE 0.9 %
30 INTRAVENOUS SOLUTION INTRAVENOUS ONCE
Status: COMPLETED | OUTPATIENT
Start: 2024-05-20 | End: 2024-05-21

## 2024-05-20 RX ORDER — ONDANSETRON 4 MG/1
4 TABLET, ORALLY DISINTEGRATING ORAL EVERY 8 HOURS PRN
Status: DISCONTINUED | OUTPATIENT
Start: 2024-05-20 | End: 2024-05-24 | Stop reason: HOSPADM

## 2024-05-20 RX ORDER — CETIRIZINE HYDROCHLORIDE 10 MG/1
10 TABLET ORAL DAILY PRN
Status: DISCONTINUED | OUTPATIENT
Start: 2024-05-20 | End: 2024-05-24 | Stop reason: HOSPADM

## 2024-05-20 RX ORDER — INSULIN LISPRO 100 [IU]/ML
0-4 INJECTION, SOLUTION INTRAVENOUS; SUBCUTANEOUS NIGHTLY
Status: DISCONTINUED | OUTPATIENT
Start: 2024-05-20 | End: 2024-05-24 | Stop reason: HOSPADM

## 2024-05-20 RX ORDER — MAGNESIUM SULFATE IN WATER 40 MG/ML
2000 INJECTION, SOLUTION INTRAVENOUS PRN
Status: DISCONTINUED | OUTPATIENT
Start: 2024-05-20 | End: 2024-05-24 | Stop reason: HOSPADM

## 2024-05-20 RX ORDER — DEXTROSE MONOHYDRATE 100 MG/ML
INJECTION, SOLUTION INTRAVENOUS CONTINUOUS PRN
Status: DISCONTINUED | OUTPATIENT
Start: 2024-05-20 | End: 2024-05-24 | Stop reason: HOSPADM

## 2024-05-20 RX ORDER — POTASSIUM CHLORIDE 7.45 MG/ML
10 INJECTION INTRAVENOUS
Status: COMPLETED | OUTPATIENT
Start: 2024-05-20 | End: 2024-05-21

## 2024-05-20 RX ORDER — 0.9 % SODIUM CHLORIDE 0.9 %
1000 INTRAVENOUS SOLUTION INTRAVENOUS ONCE
Status: COMPLETED | OUTPATIENT
Start: 2024-05-20 | End: 2024-05-21

## 2024-05-20 RX ORDER — GLUCAGON 1 MG/ML
KIT INJECTION
Status: COMPLETED
Start: 2024-05-20 | End: 2024-05-20

## 2024-05-20 RX ORDER — MAGNESIUM SULFATE 1 G/100ML
1000 INJECTION INTRAVENOUS
Status: COMPLETED | OUTPATIENT
Start: 2024-05-20 | End: 2024-05-20

## 2024-05-20 RX ORDER — POTASSIUM CHLORIDE 20 MEQ/1
40 TABLET, EXTENDED RELEASE ORAL PRN
Status: DISCONTINUED | OUTPATIENT
Start: 2024-05-20 | End: 2024-05-24 | Stop reason: HOSPADM

## 2024-05-20 RX ORDER — GLUCAGON 1 MG/ML
1 KIT INJECTION ONCE
Status: COMPLETED | OUTPATIENT
Start: 2024-05-20 | End: 2024-05-20

## 2024-05-20 RX ORDER — ACETAMINOPHEN 650 MG/1
650 SUPPOSITORY RECTAL EVERY 6 HOURS PRN
Status: DISCONTINUED | OUTPATIENT
Start: 2024-05-20 | End: 2024-05-24 | Stop reason: HOSPADM

## 2024-05-20 RX ORDER — SODIUM CHLORIDE 0.9 % (FLUSH) 0.9 %
5-40 SYRINGE (ML) INJECTION EVERY 12 HOURS SCHEDULED
Status: DISCONTINUED | OUTPATIENT
Start: 2024-05-20 | End: 2024-05-24 | Stop reason: HOSPADM

## 2024-05-20 RX ORDER — SODIUM CHLORIDE 9 MG/ML
INJECTION, SOLUTION INTRAVENOUS PRN
Status: DISCONTINUED | OUTPATIENT
Start: 2024-05-20 | End: 2024-05-24 | Stop reason: HOSPADM

## 2024-05-20 RX ORDER — ONDANSETRON 2 MG/ML
4 INJECTION INTRAMUSCULAR; INTRAVENOUS EVERY 6 HOURS PRN
Status: DISCONTINUED | OUTPATIENT
Start: 2024-05-20 | End: 2024-05-24 | Stop reason: HOSPADM

## 2024-05-20 RX ORDER — DEXTROSE MONOHYDRATE AND SODIUM CHLORIDE 5; .45 G/100ML; G/100ML
INJECTION, SOLUTION INTRAVENOUS CONTINUOUS
Status: DISCONTINUED | OUTPATIENT
Start: 2024-05-20 | End: 2024-05-21

## 2024-05-20 RX ORDER — INSULIN LISPRO 100 [IU]/ML
0-4 INJECTION, SOLUTION INTRAVENOUS; SUBCUTANEOUS
Status: DISCONTINUED | OUTPATIENT
Start: 2024-05-21 | End: 2024-05-24 | Stop reason: HOSPADM

## 2024-05-20 RX ORDER — POTASSIUM CHLORIDE 7.45 MG/ML
10 INJECTION INTRAVENOUS PRN
Status: DISCONTINUED | OUTPATIENT
Start: 2024-05-20 | End: 2024-05-24 | Stop reason: HOSPADM

## 2024-05-20 RX ORDER — POLYETHYLENE GLYCOL 3350 17 G/17G
17 POWDER, FOR SOLUTION ORAL DAILY PRN
Status: DISCONTINUED | OUTPATIENT
Start: 2024-05-20 | End: 2024-05-24 | Stop reason: HOSPADM

## 2024-05-20 RX ORDER — ACETAMINOPHEN 325 MG/1
650 TABLET ORAL EVERY 6 HOURS PRN
Status: DISCONTINUED | OUTPATIENT
Start: 2024-05-20 | End: 2024-05-24 | Stop reason: HOSPADM

## 2024-05-20 RX ORDER — SODIUM CHLORIDE 0.9 % (FLUSH) 0.9 %
5-40 SYRINGE (ML) INJECTION PRN
Status: DISCONTINUED | OUTPATIENT
Start: 2024-05-20 | End: 2024-05-24 | Stop reason: HOSPADM

## 2024-05-20 RX ADMIN — GLUCAGON 1 MG: KIT at 19:43

## 2024-05-20 RX ADMIN — CEFEPIME 2000 MG: 2 INJECTION, POWDER, FOR SOLUTION INTRAVENOUS at 22:39

## 2024-05-20 RX ADMIN — DEXTROSE MONOHYDRATE 250 ML: 100 INJECTION, SOLUTION INTRAVENOUS at 19:43

## 2024-05-20 RX ADMIN — AZITHROMYCIN MONOHYDRATE 500 MG: 500 INJECTION, POWDER, LYOPHILIZED, FOR SOLUTION INTRAVENOUS at 23:56

## 2024-05-20 RX ADMIN — POTASSIUM CHLORIDE 10 MEQ: 7.46 INJECTION, SOLUTION INTRAVENOUS at 20:49

## 2024-05-20 RX ADMIN — MAGNESIUM SULFATE HEPTAHYDRATE 1000 MG: 1 INJECTION, SOLUTION INTRAVENOUS at 20:49

## 2024-05-20 RX ADMIN — SODIUM CHLORIDE 1464 ML: 9 INJECTION, SOLUTION INTRAVENOUS at 22:39

## 2024-05-20 RX ADMIN — SODIUM CHLORIDE 1000 ML: 9 INJECTION, SOLUTION INTRAVENOUS at 23:47

## 2024-05-20 RX ADMIN — POTASSIUM CHLORIDE 10 MEQ: 7.46 INJECTION, SOLUTION INTRAVENOUS at 22:51

## 2024-05-20 RX ADMIN — DEXTROSE AND SODIUM CHLORIDE: 5; 450 INJECTION, SOLUTION INTRAVENOUS at 23:51

## 2024-05-20 RX ADMIN — VANCOMYCIN HYDROCHLORIDE 2250 MG: 1.25 INJECTION, POWDER, LYOPHILIZED, FOR SOLUTION INTRAVENOUS at 23:07

## 2024-05-20 RX ADMIN — SODIUM CHLORIDE, PRESERVATIVE FREE 10 ML: 5 INJECTION INTRAVENOUS at 23:53

## 2024-05-20 RX ADMIN — POTASSIUM CHLORIDE 10 MEQ: 7.46 INJECTION, SOLUTION INTRAVENOUS at 21:47

## 2024-05-20 ASSESSMENT — LIFESTYLE VARIABLES
HOW MANY STANDARD DRINKS CONTAINING ALCOHOL DO YOU HAVE ON A TYPICAL DAY: PATIENT DOES NOT DRINK
HOW OFTEN DO YOU HAVE A DRINK CONTAINING ALCOHOL: NEVER

## 2024-05-20 ASSESSMENT — PAIN - FUNCTIONAL ASSESSMENT: PAIN_FUNCTIONAL_ASSESSMENT: NONE - DENIES PAIN

## 2024-05-20 NOTE — ED TRIAGE NOTES
Pt arrives via ems for complaints of hypoglycemia. EMS states that pt is a new diabetic. Mom came home and found the patient lying in the floor unresponsive. Pt BG was 30 when EMS arrived. Pt given D10 enroute with EMS. Last BG with them was 110.     Pt hx of autism.

## 2024-05-20 NOTE — ED PROVIDER NOTES
[facility] EMERGENCY DEPT  EMERGENCY DEPARTMENT HISTORY AND PHYSICAL EXAM      Date: 5/20/2024  Patient Name: Michelle Gates  MRN: [id number]  Birthdate 1995  Date of evaluation: 5/20/2024  Provider: Anny Bloom MD   Note Started: 6:33 PM EDT 5/20/24    HISTORY OF PRESENT ILLNESS     Chief Complaint   Patient presents with    Hypoglycemia       History Provided By: Patient    HPI: Michelle Gates is a 29 y.o. female with PMH of autism, DM, and TBI who comes to the ED for evaluation of hyperglycemia.  Patient had recent adjustment to her medications based on her HA1C.  She was noted to have an episode of unresponsiveness in which EMS were activated patient was found to have a sugar in the 30s and given D10.  Currently, patient feels better.  Fingerstick is 110.  She thinks that she may have ate less today.  She is denying any pain anywhere or any trouble breathing.  She had couple of bowel movements in the morning but nothing out of the ordinary.  No change in her urine habits.  Is denying specific abdominal pain.    PAST MEDICAL HISTORY   Past Medical History:  Past Medical History:   Diagnosis Date    Autistic spectrum disorder     Diabetes (HCC)     Kyphosis     TBI (traumatic brain injury) (HCC)     Tic     Lower extremeties       Past Surgical History:  Past Surgical History:   Procedure Laterality Date    WISDOM TOOTH EXTRACTION         Family History:  No family history on file.    Social History:  Social History     Tobacco Use    Smoking status: Never    Smokeless tobacco: Never   Substance Use Topics    Drug use: Not Currently       Allergies:  Allergies   Allergen Reactions    Nickel Rash       PCP: Arlin Shen MD    Current Meds:   Current Facility-Administered Medications   Medication Dose Route Frequency Provider Last Rate Last Admin    0.9 % sodium chloride infusion   IntraVENous Continuous Reuben Regalado MD 50 mL/hr at 05/21/24 1133 New Bag at 05/21/24 1133    cetirizine (ZYRTEC) tablet 10

## 2024-05-21 LAB
ALBUMIN SERPL-MCNC: 3 G/DL (ref 3.5–5)
ALBUMIN/GLOB SERPL: 0.8 (ref 1.1–2.2)
ALP SERPL-CCNC: 63 U/L (ref 45–117)
ALT SERPL-CCNC: 14 U/L (ref 12–78)
ANION GAP SERPL CALC-SCNC: 5 MMOL/L (ref 5–15)
APPEARANCE UR: CLEAR
AST SERPL W P-5'-P-CCNC: 8 U/L (ref 15–37)
BACTERIA URNS QL MICRO: NEGATIVE /HPF
BASOPHILS # BLD: 0 K/UL (ref 0–0.1)
BASOPHILS NFR BLD: 0 % (ref 0–1)
BILIRUB SERPL-MCNC: 0.2 MG/DL (ref 0.2–1)
BILIRUB UR QL: NEGATIVE
BUN SERPL-MCNC: 6 MG/DL (ref 6–20)
BUN/CREAT SERPL: 12 (ref 12–20)
CA-I BLD-MCNC: 8.7 MG/DL (ref 8.5–10.1)
CHLORIDE SERPL-SCNC: 110 MMOL/L (ref 97–108)
CHOLEST SERPL-MCNC: 157 MG/DL
CO2 SERPL-SCNC: 22 MMOL/L (ref 21–32)
COLOR UR: ABNORMAL
CREAT SERPL-MCNC: 0.51 MG/DL (ref 0.55–1.02)
DIFFERENTIAL METHOD BLD: ABNORMAL
EOSINOPHIL # BLD: 0.1 K/UL (ref 0–0.4)
EOSINOPHIL NFR BLD: 1 % (ref 0–7)
EPITH CASTS URNS QL MICRO: ABNORMAL /LPF
ERYTHROCYTE [DISTWIDTH] IN BLOOD BY AUTOMATED COUNT: 13.3 % (ref 11.5–14.5)
EST. AVERAGE GLUCOSE BLD GHB EST-MCNC: 237 MG/DL
GLOBULIN SER CALC-MCNC: 3.8 G/DL (ref 2–4)
GLUCOSE BLD STRIP.AUTO-MCNC: 196 MG/DL (ref 65–100)
GLUCOSE BLD STRIP.AUTO-MCNC: 200 MG/DL (ref 65–100)
GLUCOSE BLD STRIP.AUTO-MCNC: 200 MG/DL (ref 65–100)
GLUCOSE BLD STRIP.AUTO-MCNC: 206 MG/DL (ref 65–100)
GLUCOSE BLD STRIP.AUTO-MCNC: 211 MG/DL (ref 65–100)
GLUCOSE BLD STRIP.AUTO-MCNC: 225 MG/DL (ref 65–100)
GLUCOSE BLD STRIP.AUTO-MCNC: 260 MG/DL (ref 65–100)
GLUCOSE BLD STRIP.AUTO-MCNC: 269 MG/DL (ref 65–100)
GLUCOSE BLD STRIP.AUTO-MCNC: 287 MG/DL (ref 65–100)
GLUCOSE BLD STRIP.AUTO-MCNC: 295 MG/DL (ref 65–100)
GLUCOSE BLD STRIP.AUTO-MCNC: 360 MG/DL (ref 65–100)
GLUCOSE BLD STRIP.AUTO-MCNC: 408 MG/DL (ref 65–100)
GLUCOSE SERPL-MCNC: 228 MG/DL (ref 65–100)
GLUCOSE UR STRIP.AUTO-MCNC: >500 MG/DL
HBA1C MFR BLD: 9.9 % (ref 4–5.6)
HCT VFR BLD AUTO: 35.3 % (ref 35–47)
HDLC SERPL-MCNC: 68 MG/DL
HDLC SERPL: 2.3 (ref 0–5)
HGB BLD-MCNC: 11.1 G/DL (ref 11.5–16)
HGB UR QL STRIP: ABNORMAL
IMM GRANULOCYTES # BLD AUTO: 0.1 K/UL (ref 0–0.04)
IMM GRANULOCYTES NFR BLD AUTO: 0 % (ref 0–0.5)
KETONES UR QL STRIP.AUTO: NEGATIVE MG/DL
LDLC SERPL CALC-MCNC: 83.2 MG/DL (ref 0–100)
LEUKOCYTE ESTERASE UR QL STRIP.AUTO: ABNORMAL
LIPID PANEL: NORMAL
LYMPHOCYTES # BLD: 2.4 K/UL (ref 0.8–3.5)
LYMPHOCYTES NFR BLD: 17 % (ref 12–49)
MAGNESIUM SERPL-MCNC: 2.1 MG/DL (ref 1.6–2.4)
MCH RBC QN AUTO: 23.9 PG (ref 26–34)
MCHC RBC AUTO-ENTMCNC: 31.4 G/DL (ref 30–36.5)
MCV RBC AUTO: 76.1 FL (ref 80–99)
MONOCYTES # BLD: 0.9 K/UL (ref 0–1)
MONOCYTES NFR BLD: 6 % (ref 5–13)
MUCOUS THREADS URNS QL MICRO: ABNORMAL /LPF
NEUTS SEG # BLD: 10.9 K/UL (ref 1.8–8)
NEUTS SEG NFR BLD: 76 % (ref 32–75)
NITRITE UR QL STRIP.AUTO: NEGATIVE
NRBC # BLD: 0 K/UL (ref 0–0.01)
NRBC BLD-RTO: 0 PER 100 WBC
PERFORMED BY:: ABNORMAL
PH UR STRIP: 5 (ref 5–8)
PHOSPHATE SERPL-MCNC: 2.7 MG/DL (ref 2.6–4.7)
PLATELET # BLD AUTO: 406 K/UL (ref 150–400)
PMV BLD AUTO: 9.6 FL (ref 8.9–12.9)
POTASSIUM SERPL-SCNC: 3.9 MMOL/L (ref 3.5–5.1)
PROCALCITONIN SERPL-MCNC: <0.05 NG/ML
PROT SERPL-MCNC: 6.8 G/DL (ref 6.4–8.2)
PROT UR STRIP-MCNC: NEGATIVE MG/DL
RBC # BLD AUTO: 4.64 M/UL (ref 3.8–5.2)
RBC #/AREA URNS HPF: ABNORMAL /HPF (ref 0–5)
SODIUM SERPL-SCNC: 137 MMOL/L (ref 136–145)
SP GR UR REFRACTOMETRY: 1.01 (ref 1–1.03)
TRIGL SERPL-MCNC: 29 MG/DL
URINE CULTURE IF INDICATED: ABNORMAL
UROBILINOGEN UR QL STRIP.AUTO: 0.1 EU/DL (ref 0.1–1)
VLDLC SERPL CALC-MCNC: 5.8 MG/DL
WBC # BLD AUTO: 14.4 K/UL (ref 3.6–11)
WBC URNS QL MICRO: ABNORMAL /HPF (ref 0–4)

## 2024-05-21 PROCEDURE — 6360000002 HC RX W HCPCS: Performed by: FAMILY MEDICINE

## 2024-05-21 PROCEDURE — 87088 URINE BACTERIA CULTURE: CPT

## 2024-05-21 PROCEDURE — 2580000003 HC RX 258: Performed by: FAMILY MEDICINE

## 2024-05-21 PROCEDURE — 87186 SC STD MICRODIL/AGAR DIL: CPT

## 2024-05-21 PROCEDURE — 82962 GLUCOSE BLOOD TEST: CPT

## 2024-05-21 PROCEDURE — 81001 URINALYSIS AUTO W/SCOPE: CPT

## 2024-05-21 PROCEDURE — 99223 1ST HOSP IP/OBS HIGH 75: CPT | Performed by: INTERNAL MEDICINE

## 2024-05-21 PROCEDURE — 87086 URINE CULTURE/COLONY COUNT: CPT

## 2024-05-21 PROCEDURE — 85025 COMPLETE CBC W/AUTO DIFF WBC: CPT

## 2024-05-21 PROCEDURE — 80053 COMPREHEN METABOLIC PANEL: CPT

## 2024-05-21 PROCEDURE — 1100000000 HC RM PRIVATE

## 2024-05-21 PROCEDURE — 6370000000 HC RX 637 (ALT 250 FOR IP): Performed by: FAMILY MEDICINE

## 2024-05-21 RX ORDER — HYDROXYZINE HYDROCHLORIDE 25 MG/1
25 TABLET, FILM COATED ORAL 3 TIMES DAILY PRN
Status: ON HOLD | COMMUNITY
End: 2024-05-24 | Stop reason: HOSPADM

## 2024-05-21 RX ORDER — INSULIN LISPRO 100 [IU]/ML
10 INJECTION, SOLUTION INTRAVENOUS; SUBCUTANEOUS ONCE
Status: COMPLETED | OUTPATIENT
Start: 2024-05-21 | End: 2024-05-21

## 2024-05-21 RX ORDER — SODIUM CHLORIDE 9 MG/ML
INJECTION, SOLUTION INTRAVENOUS CONTINUOUS
Status: DISCONTINUED | OUTPATIENT
Start: 2024-05-21 | End: 2024-05-24 | Stop reason: HOSPADM

## 2024-05-21 RX ADMIN — SODIUM CHLORIDE, PRESERVATIVE FREE 10 ML: 5 INJECTION INTRAVENOUS at 08:19

## 2024-05-21 RX ADMIN — PIPERACILLIN AND TAZOBACTAM 3375 MG: 3; .375 INJECTION, POWDER, LYOPHILIZED, FOR SOLUTION INTRAVENOUS at 22:12

## 2024-05-21 RX ADMIN — PIPERACILLIN AND TAZOBACTAM 4500 MG: 4; .5 INJECTION, POWDER, LYOPHILIZED, FOR SOLUTION INTRAVENOUS at 01:09

## 2024-05-21 RX ADMIN — POTASSIUM CHLORIDE 10 MEQ: 7.46 INJECTION, SOLUTION INTRAVENOUS at 02:37

## 2024-05-21 RX ADMIN — PIPERACILLIN AND TAZOBACTAM 3375 MG: 3; .375 INJECTION, POWDER, LYOPHILIZED, FOR SOLUTION INTRAVENOUS at 05:42

## 2024-05-21 RX ADMIN — POTASSIUM CHLORIDE 10 MEQ: 7.46 INJECTION, SOLUTION INTRAVENOUS at 00:27

## 2024-05-21 RX ADMIN — ENOXAPARIN SODIUM 40 MG: 100 INJECTION SUBCUTANEOUS at 08:18

## 2024-05-21 RX ADMIN — SODIUM CHLORIDE: 9 INJECTION, SOLUTION INTRAVENOUS at 11:33

## 2024-05-21 RX ADMIN — INSULIN LISPRO 10 UNITS: 100 INJECTION, SOLUTION INTRAVENOUS; SUBCUTANEOUS at 20:28

## 2024-05-21 RX ADMIN — INSULIN LISPRO 2 UNITS: 100 INJECTION, SOLUTION INTRAVENOUS; SUBCUTANEOUS at 18:25

## 2024-05-21 RX ADMIN — SODIUM CHLORIDE, PRESERVATIVE FREE 10 ML: 5 INJECTION INTRAVENOUS at 20:31

## 2024-05-21 RX ADMIN — INSULIN LISPRO 4 UNITS: 100 INJECTION, SOLUTION INTRAVENOUS; SUBCUTANEOUS at 11:34

## 2024-05-21 RX ADMIN — POTASSIUM CHLORIDE 10 MEQ: 7.46 INJECTION, SOLUTION INTRAVENOUS at 01:30

## 2024-05-21 NOTE — ED NOTES
Verbal bedside shift report to Xiao MIRANDA. Patient connected to continuous cardiac, bp, sp02 monitoring. No signs of distress noted. Call bell within reach, bed locked and in lowest position.

## 2024-05-21 NOTE — ED NOTES
Assumed care of patient at this time from Thania RN.Patient connected to continuous cardiac, bp, sp02 monitoring. No signs of distress noted. Call bell within reach, bed locked and in lowest position. Bare hugger removed at this time.

## 2024-05-21 NOTE — CARE COORDINATION
05/21/24 1012   Service Assessment   Patient Orientation Alert and Oriented   Cognition Alert   History Provided By Patient   Primary Caregiver Self   Accompanied By/Relationship Mother Nichole Gates   Support Systems Parent   Patient's Healthcare Decision Maker is: Legal Next of Kin   PCP Verified by CM Yes  (Arlin Arreaga - last month.)   Last Visit to PCP Within last 3 months   Prior Functional Level Independent in ADLs/IADLs   Current Functional Level Independent in ADLs/IADLs   Can patient return to prior living arrangement Yes   Ability to make needs known: Good   Family able to assist with home care needs: Yes   Would you like for me to discuss the discharge plan with any other family members/significant others, and if so, who? Yes  (Mother Nichole.)   Financial Resources Other (Comment)  ([employer])   Community Resources None   CM/SW Referral Other (see comment)  (None)   Social/Functional History   Lives With Parent  (Lives with mother.)   Type of Home Apartment   Home Layout Two level   Home Access Stairs to enter without rails   Bathroom Shower/Tub Tub/Shower unit   Bathroom Toilet Standard   Bathroom Equipment None   Bathroom Accessibility Accessible   Home Equipment None   Receives Help From Family   ADL Assistance Independent   Homemaking Assistance Independent   Homemaking Responsibilities Yes   Ambulation Assistance Independent   Transfer Assistance Independent   Active  No   Occupation Part time employment   Discharge Planning   Type of Residence Apartment   Living Arrangements Parent   Current Services Prior To Admission None   Potential Assistance Needed N/A   DME Ordered? No   Potential Assistance Purchasing Medications No   Type of Home Care Services None   Patient expects to be discharged to: Apartment   One/Two Story Residence Two story   History of falls? 0   Services At/After Discharge   Transition of Care Consult (CM Consult) Discharge Planning   Services At/After Discharge None

## 2024-05-21 NOTE — CONSULTS
PULMONARY CONSULT  [facility]    Name: Michelle Gates MRN: [id number]   : 1995 Hospital: [facility]   Date: 2024  Admission date: 2024 Hospital Day: 2       HPI:     Patient Active Problem List   Diagnosis    Hypokalemia    Hypophosphatemia    Hypoglycemia    Sepsis (HCC)             [x] High complexity decision making was performed  [x] See my orders for details      Subjective/Initial History:     I was asked by Reuben Regalado MD to see Michelle Gates  a 29 y.o.   female in consultation     Excerpts from admission 2024 or consult notes as follows:   29-year-old lady came in because of low blood sugar she has history of traumatic brain injury in the past also type 1 diabetes mellitus her blood sugar was low she was found unresponsive she was given D10 came to the hospital chest x-ray shows left hilar infiltrate denies any fever chest pain but she was hypothermic and hypoglycemic so admitted and pulm consult      Allergies   Allergen Reactions    Nickel Rash        MAR reviewed and pertinent medications noted or modified as needed     Current Facility-Administered Medications   Medication Dose Route Frequency Provider Last Rate Last Admin    cetirizine (ZYRTEC) tablet 10 mg  10 mg Oral Daily PRN Reuben Regalado MD        sodium chloride flush 0.9 % injection 5-40 mL  5-40 mL IntraVENous 2 times per day Reuben Regalado MD   10 mL at 24 0819    sodium chloride flush 0.9 % injection 5-40 mL  5-40 mL IntraVENous PRN Reuben Regalado MD        0.9 % sodium chloride infusion   IntraVENous PRN Reuben Regalado MD        potassium chloride (KLOR-CON M) extended release tablet 40 mEq  40 mEq Oral PRN Reuben Regalado MD        Or    potassium bicarb-citric acid (EFFER-K) effervescent tablet 40 mEq  40 mEq Oral PRReuben Vasquez MD        Or    potassium chloride 10 mEq/100 mL IVPB (Peripheral Line)  10 mEq IntraVENous PRReuben Vasquez MD     and SSI  Bronchial hygiene with respiratory therapy techniques, bronchodilators  DVT, SUP prophylaxis       This care involved high complexity medical decision making: I personally:  Reviewed the flowsheet and previous days notes  Reviewed and summarized records or history from previous days note or discussions with staff, family  High Risk Drug therapy requiring intensive monitoring for toxicity: eg steroids, pressors, antibiotics  Reviewed and/or ordered Clinical lab tests  Reviewed images and/or ordered Radiology tests  Reviewed the patients ECG / Telemetry  Reviewed and/or adjusted NiPPV settings  Called and arranged for Radiologic procedures or interventions  performed or ordered Diagnostic endoscopies with identified risk factors.  discussed my assessment/management with : Nursing, Hospitalist and Family for coordination of care          Jett Coombs MD

## 2024-05-21 NOTE — H&P
History and Physical    NAME:   Michelle Gates   :  1995   MRN:  [id number]     Date/Time: 2024 10:48 PM    Patient PCP: Arlin Shen MD  ______________________________________________________________________       Subjective:     CHIEF COMPLAINT:       Hypoglycemia hypothermia      HISTORY OF PRESENT ILLNESS:     General Daily Progress Note  Patient is a 29 y.o. year old female with signal past medical history of type 1 diabetes autistic traumatic brain injury came to emergency room with hypoglycemia hypothermia  Patient normally follow-up with endocrinologist patient was found unresponsive lying in the floor blood sugar was 30 when EMS arrived patient received D10 and route blood sugar went up to 110 when I saw the patient patient was alert awake answering all the questions because of hypothermia hypoglycemia septic alert was called patient received almost 3 L blood culture urine culture was ordered received cefepime and vancomycin by the ER physician patient was admitted to the ICU for further workup     Patient's parents at the bedside  Past Medical History:   Diagnosis Date    Autistic spectrum disorder     Diabetes (HCC)     Kyphosis     TBI (traumatic brain injury) (HCC)     Tic     Lower extremeties        Past Surgical History:   Procedure Laterality Date    WISDOM TOOTH EXTRACTION         Social History     Tobacco Use    Smoking status: Never    Smokeless tobacco: Never   Substance Use Topics    Alcohol use: Not on file        No family history on file.    Allergies   Allergen Reactions    Nickel Rash        Prior to Admission medications    Medication Sig Start Date End Date Taking? Authorizing Provider   Insulin Degludec 100 UNIT/ML SOPN 30 units once daily, may be adjusted by MD to daily max 50 units 24  Yes Provider, Historical, MD   insulin aspart (NOVOLOG) 100 UNIT/ML injection pen 2-10 units SQ ac meals per scale, estimated daily max 50 units 23  Yes Provider, Historical,

## 2024-05-21 NOTE — ED NOTES
ED TO INPATIENT SBAR HANDOFF    Patient Name: Michelle Gates   Preferred Name: Michelle PONCE  : 1995  29 y.o.   Family/Caregiver Present: no   Code Status Order: Full Code  PO Status: NPO:No  Telemetry Order:   C-SSRS: Risk of Suicide: No Risk  Sitter no  none  Restraints:     Sepsis Risk Score Sepsis Risk Score: 5.49    Situation  Chief Complaint   Patient presents with    Hypoglycemia     Brief Description of Patient's Condition: pt was found to have a BG of 30 and was unresponsive core temp was 94 rectal on arrival; pt is alert and more responsive with her family at bedside   Mental Status: oriented and alert  Arrived from:Home  Imaging:   XR CHEST PORTABLE   Final Result      Low lung volumes with left perihilar opacity that may represent atelectasis or   infection.         XR CHEST PORTABLE    (Results Pending)     Abnormal labs:   Abnormal Labs Reviewed   BASIC METABOLIC PANEL - Abnormal; Notable for the following components:       Result Value    Potassium 2.8 (*)     Chloride 109 (*)     Glucose 113 (*)     Creatinine 0.47 (*)     BUN/Creatinine Ratio 23 (*)     All other components within normal limits   CBC WITH AUTO DIFFERENTIAL - Abnormal; Notable for the following components:    WBC 16.1 (*)     MCV 76.1 (*)     MCH 24.1 (*)     Platelets 429 (*)     Neutrophils % 89 (*)     Lymphocytes % 8 (*)     Monocytes % 3 (*)     Neutrophils Absolute 14.2 (*)     Immature Granulocytes Absolute 0.1 (*)     All other components within normal limits   PROCALCITONIN - Abnormal; Notable for the following components:    Procalcitonin <0.05 (*)     All other components within normal limits   URINALYSIS WITH REFLEX TO CULTURE - Abnormal; Notable for the following components:    Glucose, Ur >500 (*)     Blood, Urine Moderate (*)     Leukocyte Esterase, Urine Trace (*)     Urine Culture if Indicated Urine Culture Ordered (*)     Mucus, UA Trace (*)     All other components within normal limits   HEMOGLOBIN A1C -  following components:    POC Glucose 200 (*)     All other components within normal limits   POCT GLUCOSE - Abnormal; Notable for the following components:    POC Glucose 269 (*)     All other components within normal limits   POCT GLUCOSE - Abnormal; Notable for the following components:    POC Glucose 206 (*)     All other components within normal limits   POCT GLUCOSE - Abnormal; Notable for the following components:    POC Glucose 196 (*)     All other components within normal limits   POCT GLUCOSE - Abnormal; Notable for the following components:    POC Glucose 225 (*)     All other components within normal limits   POCT GLUCOSE - Abnormal; Notable for the following components:    POC Glucose 200 (*)     All other components within normal limits   POCT GLUCOSE - Abnormal; Notable for the following components:    POC Glucose 211 (*)     All other components within normal limits   POCT GLUCOSE - Abnormal; Notable for the following components:    POC Glucose 408 (*)     All other components within normal limits       Background  Allergies:   Allergies   Allergen Reactions    Nickel Rash     History:   Past Medical History:   Diagnosis Date    Autistic spectrum disorder     Diabetes (HCC)     Kyphosis     TBI (traumatic brain injury) (HCC)     Tic     Lower extremeties       Assessment  Vitals:    Level of Consciousness: Alert (0)   Vitals:    05/21/24 0730 05/21/24 0800 05/21/24 0815 05/21/24 0830   BP: (!) 164/112 (!) 134/54  (!) 141/98   Pulse: 86 (!) 114 (!) 107 (!) 104   Resp: 18 20 21 22   Temp:       TempSrc:       SpO2: 100% 100% 100% 100%   Weight:       Height:         Deterioration Index (DI): Deterioration Index: 22.97  Deterioration Index (DI) Interventions Performed:    O2 Flow Rate:    O2 Device:    Cardiac Rhythm:    Critical Lab Results: [unfilled]  Cultures: Cultures:Urine  NIH Score: NIH     Active LDA's:   Peripheral IV 05/21/24 Left Forearm (Active)     Active Central Lines:

## 2024-05-21 NOTE — PROGRESS NOTES
4 Eyes Skin Assessment     NAME:  Michelle Gates  YOB: 1995  MEDICAL RECORD NUMBER:  [id number]    The patient is being assessed for  Admission    I agree that at least one RN has performed a thorough Head to Toe Skin Assessment on the patient. ALL assessment sites listed below have been assessed.      Areas assessed by both nurses:    Head, Face, Ears, Shoulders, Back, Chest, Arms, Elbows, Hands, Sacrum. Buttock, Coccyx, Ischium, Legs. Feet and Heels, and Under Medical Devices         Does the Patient have a Wound? No noted wound(s)       Elkin Prevention initiated by RN: Yes  Wound Care Orders initiated by RN: No    Pressure Injury (Stage 3,4, Unstageable, DTI, NWPT, and Complex wounds) if present, place Wound referral order by RN under : No    New Ostomies, if present place, Ostomy referral order under : No     Nurse 1 eSignature: Electronically signed by Mario Gunn RN on 5/21/24 at 4:30 PM EDT    **SHARE this note so that the co-signing nurse can place an eSignature**    Nurse 2 eSignature: Electronically signed by Meera Pryor RN on 5/21/24 at 4:31 PM EDT

## 2024-05-21 NOTE — PROGRESS NOTES
History and Physical    NAME:   Michelle Gates   :  1995   MRN:  [id number]     Date/Time: 2024 10:53 AM    Patient PCP: Arlin Shen MD  ______________________________________________________________________       Subjective:     CHIEF COMPLAINT:       Hypoglycemia hypothermia      HISTORY OF PRESENT ILLNESS:     General Daily Progress Note  Patient is a 29 y.o. year old female with signal past medical history of type 1 diabetes autistic traumatic brain injury came to emergency room with hypoglycemia hypothermia  Patient normally follow-up with endocrinologist patient was found unresponsive lying in the floor blood sugar was 30 when EMS arrived patient received D10 and route blood sugar went up to 110 when I saw the patient patient was alert awake answering all the questions because of hypothermia hypoglycemia septic alert was called patient received almost 3 L blood culture urine culture was ordered received cefepime and vancomycin by the ER physician patient was admitted to the ICU for further workup         Patient alert awake denies any chest pain or shortness of breath nausea no vomiting/sugars around 200  X-ray shows atelectasis/infection    Patient's parents at the bedside  Past Medical History:   Diagnosis Date    Autistic spectrum disorder     Diabetes (HCC)     Kyphosis     TBI (traumatic brain injury) (HCC)     Tic     Lower extremeties        Past Surgical History:   Procedure Laterality Date    WISDOM TOOTH EXTRACTION         Social History     Tobacco Use    Smoking status: Never    Smokeless tobacco: Never   Substance Use Topics    Alcohol use: Not on file        No family history on file.    Allergies   Allergen Reactions    Nickel Rash        Prior to Admission medications    Medication Sig Start Date End Date Taking? Authorizing Provider   Insulin Degludec 100 UNIT/ML SOPN 30 units once daily, may be adjusted by MD to daily max 50 units 24  Yes Provider, MD Parmjit  Time: 05/21/24  6:13 AM    Specimen: Urine   Result Value Ref Range    Color, UA Yellow/Straw      Appearance Clear Clear      Specific Gravity, UA 1.012 1.003 - 1.030      pH, Urine 5.0 5.0 - 8.0      Protein, UA Negative Negative mg/dL    Glucose, Ur >500 (A) Negative mg/dL    Ketones, Urine Negative Negative mg/dL    Bilirubin, Urine Negative Negative      Blood, Urine Moderate (A) Negative      Urobilinogen, Urine 0.1 0.1 - 1.0 EU/dL    Nitrite, Urine Negative Negative      Leukocyte Esterase, Urine Trace (A) Negative      WBC, UA 10-20 0 - 4 /hpf    RBC, UA 0-5 0 - 5 /hpf    Epithelial Cells, UA Few Few /lpf    BACTERIA, URINE Negative Negative /hpf    Urine Culture if Indicated Urine Culture Ordered (A) Culture not indicated by UA result      Mucus, UA Trace (A) Negative /lpf   Comprehensive Metabolic Panel w/ Reflex to MG    Collection Time: 05/21/24  6:13 AM   Result Value Ref Range    Sodium 137 136 - 145 mmol/L    Potassium 3.9 3.5 - 5.1 mmol/L    Chloride 110 (H) 97 - 108 mmol/L    CO2 22 21 - 32 mmol/L    Anion Gap 5 5 - 15 mmol/L    Glucose 228 (H) 65 - 100 mg/dL    BUN 6 6 - 20 mg/dL    Creatinine 0.51 (L) 0.55 - 1.02 mg/dL    BUN/Creatinine Ratio 12 12 - 20      Est, Glom Filt Rate >90 >60 ml/min/1.73m2    Calcium 8.7 8.5 - 10.1 mg/dL    Total Bilirubin 0.2 0.2 - 1.0 mg/dL    AST 8 (L) 15 - 37 U/L    ALT 14 12 - 78 U/L    Alk Phosphatase 63 45 - 117 U/L    Total Protein 6.8 6.4 - 8.2 g/dL    Albumin 3.0 (L) 3.5 - 5.0 g/dL    Globulin 3.8 2.0 - 4.0 g/dL    Albumin/Globulin Ratio 0.8 (L) 1.1 - 2.2     CBC with Auto Differential    Collection Time: 05/21/24  6:13 AM   Result Value Ref Range    WBC 14.4 (H) 3.6 - 11.0 K/uL    RBC 4.64 3.80 - 5.20 M/uL    Hemoglobin 11.1 (L) 11.5 - 16.0 g/dL    Hematocrit 35.3 35.0 - 47.0 %    MCV 76.1 (L) 80.0 - 99.0 FL    MCH 23.9 (L) 26.0 - 34.0 PG    MCHC 31.4 30.0 - 36.5 g/dL    RDW 13.3 11.5 - 14.5 %    Platelets 406 (H) 150 - 400 K/uL    MPV 9.6 8.9 - 12.9 FL

## 2024-05-21 NOTE — CONSULTS
Consult Note            Date:5/21/2024        Patient Name:Michelle Gates     YOB: 1995     Age:29 y.o.    Consults Infectious Disease     Chief Complaint     Chief Complaint   Patient presents with    Hypoglycemia      Sepsis    History Obtained From   Patient and family members    History of Present Illness    This is a 29 year old female with  autism, diabetes, TBI, brought to ED by her mother because of hypoglycemia and unresponsiveness.  BG reportedly 30 mg/dl by EMS. She was given D10 enroute. On arrival she was hypothermic with leukocytosis. UA showed moderate pyuria, no bacteria. CXR showed mild left perihilar infiltrate. Blood and urine cultures sent. Patient was given doses of Vancomycn and Cefepime, now on Zosyn alone.  ID has been consulted for this reason.      Patient seen in the ED where she is awake and responsive.  She offered no complaints except for diarrhea. She denied any respiratory symptoms or urinary tract symptoms.  Reported a right great toe wound.  Followed at [facility], seen last week with no adjustment in insulin. Family member states that she was unresponsive at home but had been well just prior to that.  Past Medical History     Past Medical History:   Diagnosis Date    Autistic spectrum disorder     Diabetes (HCC)     Kyphosis     TBI (traumatic brain injury) (HCC)     Tic     Lower extremeties        Past Surgical History     Past Surgical History:   Procedure Laterality Date    WISDOM TOOTH EXTRACTION          Medications     Prior to Admission medications    Medication Sig Start Date End Date Taking? Authorizing Provider   Insulin Degludec 100 UNIT/ML SOPN 30 units once daily, may be adjusted by MD to daily max 50 units 5/17/24  Yes Provider, Historical, MD   insulin aspart (NOVOLOG) 100 UNIT/ML injection pen 2-10 units SQ ac meals per scale, estimated daily max 50 units 9/11/23  Yes Provider, Historical, MD   cetirizine (ZYRTEC) 10 MG tablet Take 1 tablet by  place, and time.   Psychiatric:         Mood and Affect: Mood normal.         Behavior: Behavior normal.         Thought Content: Thought content normal.         Judgment: Judgment normal.         Labs    CBC:  Recent Labs     05/20/24 1952 05/21/24 0613   WBC 16.1* 14.4*   RBC 4.81 4.64   HGB 11.6 11.1*   HCT 36.6 35.3   MCV 76.1* 76.1*   RDW 13.0 13.3   * 406*     CHEMISTRIES:  Recent Labs     05/20/24 1952 05/20/24 2159 05/20/24 2223 05/21/24 0613     --   --  137   K 2.8*  --   --  3.9   *  --   --  110*   CO2 22  --   --  22   BUN 11  --   --  6   CREATININE 0.47* 0.37*  --  0.51*   GLUCOSE 113*  --   --  228*   PHOS  --   --  2.7  --    MG  --   --  2.1  --          Latest Reference Range & Units 05/20/24 22:56 05/21/24 06:13   Glucose, Ur Negative mg/dL >500 ! >500 !   Bilirubin, Urine Negative   Negative Negative   Ketones, Urine Negative mg/dL 5 ! Negative   Specific Gravity, UA 1.003 - 1.030   1.014 1.012   Blood, Urine Negative   Large ! Moderate !   Protein, UA Negative mg/dL Negative Negative   Urobilinogen 0.1 - 1.0 EU/dL 0.1 0.1   Nitrite, Urine Negative   Negative Negative   Leukocyte Esterase, Urine Negative   Trace ! Trace !   Appearance Clear   Turbid ! Clear   pH, Urine 5.0 - 8.0   5.0 5.0   Mucus, UA Negative /lpf Trace ! Trace !   WBC, UA 0 - 4 /hpf 5-10 10-20   RBC, UA 0 - 5 /hpf 0-5 0-5   Epithelial Cells, UA Few /lpf  Few   Bacteria, UA Negative /hpf Negative Negative      Blood cultures (5/20) in process  Blood cultures (5/20) in process  Urine culture (5/20) in process  Urine culture (5/21) in process    Imaging/Diagnostics     Personally reviewed by me.    XR CHEST PORTABLE    Result Date: 5/20/2024  Low lung volumes with left perihilar opacity that may represent atelectasis or infection.         Assessment      Hospital Problems             Last Modified POA    * (Principal) Hypoglycemia 5/20/2024 Yes    Sepsis (HCC) 5/20/2024 Yes     SIRS/Sepsis with hypothermia

## 2024-05-22 ENCOUNTER — APPOINTMENT (OUTPATIENT)
Facility: HOSPITAL | Age: 29
End: 2024-05-22
Payer: COMMERCIAL

## 2024-05-22 ENCOUNTER — APPOINTMENT (OUTPATIENT)
Facility: HOSPITAL | Age: 29
End: 2024-05-22
Attending: FAMILY MEDICINE
Payer: COMMERCIAL

## 2024-05-22 LAB
ALBUMIN SERPL-MCNC: 3.4 G/DL (ref 3.5–5)
ALBUMIN/GLOB SERPL: 1 (ref 1.1–2.2)
ALP SERPL-CCNC: 71 U/L (ref 45–117)
ALT SERPL-CCNC: 15 U/L (ref 12–78)
ANION GAP BLD CALC-SCNC: 13 (ref 10–20)
ANION GAP SERPL CALC-SCNC: 8 MMOL/L (ref 5–15)
AST SERPL W P-5'-P-CCNC: 10 U/L (ref 15–37)
BACTERIA SPEC CULT: NORMAL
BILIRUB SERPL-MCNC: 0.3 MG/DL (ref 0.2–1)
BUN SERPL-MCNC: 5 MG/DL (ref 6–20)
BUN/CREAT SERPL: 7 (ref 12–20)
CA-I BLD-MCNC: 1.16 MMOL/L (ref 1.12–1.32)
CA-I BLD-MCNC: 9.7 MG/DL (ref 8.5–10.1)
CHLORIDE BLD-SCNC: 108 MMOL/L
CHLORIDE SERPL-SCNC: 106 MMOL/L (ref 97–108)
CO2 BLD-SCNC: 21 MMOL/L
CO2 SERPL-SCNC: 24 MMOL/L (ref 21–32)
CREAT SERPL-MCNC: 0.73 MG/DL (ref 0.55–1.02)
CREAT UR-MCNC: 0.4 MG/DL (ref 0.6–1.3)
CRP SERPL-MCNC: 0.72 MG/DL (ref 0–0.3)
EKG ATRIAL RATE: 119 BPM
EKG DIAGNOSIS: NORMAL
EKG P AXIS: 51 DEGREES
EKG P-R INTERVAL: 150 MS
EKG Q-T INTERVAL: 326 MS
EKG QRS DURATION: 74 MS
EKG QTC CALCULATION (BAZETT): 458 MS
EKG R AXIS: 71 DEGREES
EKG T AXIS: 16 DEGREES
EKG VENTRICULAR RATE: 119 BPM
ERYTHROCYTE [DISTWIDTH] IN BLOOD BY AUTOMATED COUNT: 13.3 % (ref 11.5–14.5)
GLOBULIN SER CALC-MCNC: 3.4 G/DL (ref 2–4)
GLUCOSE BLD STRIP.AUTO-MCNC: 251 MG/DL (ref 65–100)
GLUCOSE BLD STRIP.AUTO-MCNC: 284 MG/DL (ref 65–100)
GLUCOSE BLD STRIP.AUTO-MCNC: 291 MG/DL (ref 65–100)
GLUCOSE BLD STRIP.AUTO-MCNC: 311 MG/DL (ref 65–100)
GLUCOSE BLD STRIP.AUTO-MCNC: 325 MG/DL (ref 65–100)
GLUCOSE SERPL-MCNC: 308 MG/DL (ref 65–100)
GLUCOSE SERPL-MCNC: 89 MG/DL (ref 65–100)
HCT VFR BLD AUTO: 35.5 % (ref 35–47)
HGB BLD-MCNC: 11.1 G/DL (ref 11.5–16)
LACTATE BLD-SCNC: 1.98 MMOL/L (ref 0.4–2)
Lab: NORMAL
M PNEUMO IGM SER IA-ACNC: REACTIVE
MCH RBC QN AUTO: 23.9 PG (ref 26–34)
MCHC RBC AUTO-ENTMCNC: 31.3 G/DL (ref 30–36.5)
MCV RBC AUTO: 76.3 FL (ref 80–99)
NRBC # BLD: 0 K/UL (ref 0–0.01)
NRBC BLD-RTO: 0 PER 100 WBC
PERFORMED BY:: ABNORMAL
PLATELET # BLD AUTO: 379 K/UL (ref 150–400)
PMV BLD AUTO: 9.6 FL (ref 8.9–12.9)
POTASSIUM BLD-SCNC: 4.6 MMOL/L (ref 3.5–5.5)
POTASSIUM SERPL-SCNC: 4.2 MMOL/L (ref 3.5–5.1)
PROCALCITONIN SERPL-MCNC: <0.05 NG/ML
PROT SERPL-MCNC: 6.8 G/DL (ref 6.4–8.2)
RBC # BLD AUTO: 4.65 M/UL (ref 3.8–5.2)
SODIUM BLD-SCNC: 141 MMOL/L (ref 136–145)
SODIUM SERPL-SCNC: 138 MMOL/L (ref 136–145)
WBC # BLD AUTO: 8 K/UL (ref 3.6–11)

## 2024-05-22 PROCEDURE — 76770 US EXAM ABDO BACK WALL COMP: CPT

## 2024-05-22 PROCEDURE — 85027 COMPLETE CBC AUTOMATED: CPT

## 2024-05-22 PROCEDURE — 82947 ASSAY GLUCOSE BLOOD QUANT: CPT

## 2024-05-22 PROCEDURE — 6360000002 HC RX W HCPCS: Performed by: FAMILY MEDICINE

## 2024-05-22 PROCEDURE — 84145 PROCALCITONIN (PCT): CPT

## 2024-05-22 PROCEDURE — 6370000000 HC RX 637 (ALT 250 FOR IP): Performed by: FAMILY MEDICINE

## 2024-05-22 PROCEDURE — 86140 C-REACTIVE PROTEIN: CPT

## 2024-05-22 PROCEDURE — 86738 MYCOPLASMA ANTIBODY: CPT

## 2024-05-22 PROCEDURE — 36415 COLL VENOUS BLD VENIPUNCTURE: CPT

## 2024-05-22 PROCEDURE — 93306 TTE W/DOPPLER COMPLETE: CPT

## 2024-05-22 PROCEDURE — 2580000003 HC RX 258: Performed by: FAMILY MEDICINE

## 2024-05-22 PROCEDURE — 99232 SBSQ HOSP IP/OBS MODERATE 35: CPT | Performed by: INTERNAL MEDICINE

## 2024-05-22 PROCEDURE — 71045 X-RAY EXAM CHEST 1 VIEW: CPT

## 2024-05-22 PROCEDURE — 1100000000 HC RM PRIVATE

## 2024-05-22 PROCEDURE — 82962 GLUCOSE BLOOD TEST: CPT

## 2024-05-22 PROCEDURE — 80053 COMPREHEN METABOLIC PANEL: CPT

## 2024-05-22 PROCEDURE — 87449 NOS EACH ORGANISM AG IA: CPT

## 2024-05-22 RX ORDER — AZITHROMYCIN 500 MG/1
250 TABLET, FILM COATED ORAL DAILY
Status: DISCONTINUED | OUTPATIENT
Start: 2024-05-23 | End: 2024-05-23

## 2024-05-22 RX ADMIN — PIPERACILLIN AND TAZOBACTAM 3375 MG: 3; .375 INJECTION, POWDER, LYOPHILIZED, FOR SOLUTION INTRAVENOUS at 21:58

## 2024-05-22 RX ADMIN — METOPROLOL TARTRATE 25 MG: 25 TABLET, FILM COATED ORAL at 10:32

## 2024-05-22 RX ADMIN — INSULIN LISPRO 2 UNITS: 100 INJECTION, SOLUTION INTRAVENOUS; SUBCUTANEOUS at 12:14

## 2024-05-22 RX ADMIN — SODIUM CHLORIDE, PRESERVATIVE FREE 10 ML: 5 INJECTION INTRAVENOUS at 20:51

## 2024-05-22 RX ADMIN — INSULIN LISPRO 4 UNITS: 100 INJECTION, SOLUTION INTRAVENOUS; SUBCUTANEOUS at 17:46

## 2024-05-22 RX ADMIN — PIPERACILLIN AND TAZOBACTAM 3375 MG: 3; .375 INJECTION, POWDER, LYOPHILIZED, FOR SOLUTION INTRAVENOUS at 05:48

## 2024-05-22 RX ADMIN — AZITHROMYCIN MONOHYDRATE 500 MG: 500 INJECTION, POWDER, LYOPHILIZED, FOR SOLUTION INTRAVENOUS at 02:21

## 2024-05-22 RX ADMIN — PIPERACILLIN AND TAZOBACTAM 3375 MG: 3; .375 INJECTION, POWDER, LYOPHILIZED, FOR SOLUTION INTRAVENOUS at 14:51

## 2024-05-22 RX ADMIN — SODIUM CHLORIDE, PRESERVATIVE FREE 10 ML: 5 INJECTION INTRAVENOUS at 08:49

## 2024-05-22 RX ADMIN — METOPROLOL TARTRATE 25 MG: 25 TABLET, FILM COATED ORAL at 20:49

## 2024-05-22 ASSESSMENT — ENCOUNTER SYMPTOMS
ALLERGIC/IMMUNOLOGIC NEGATIVE: 1
EYES NEGATIVE: 1
GASTROINTESTINAL NEGATIVE: 1
RESPIRATORY NEGATIVE: 1

## 2024-05-22 NOTE — PLAN OF CARE
Problem: Discharge Planning  Goal: Discharge to home or other facility with appropriate resources  5/21/2024 2117 by Hakeem Bhatti, RN  Outcome: Progressing  5/21/2024 1620 by Mario Gunn, RN  Outcome: Progressing     Problem: Safety - Adult  Goal: Free from fall injury  5/21/2024 2117 by Hakeem Bhatti, RN  Outcome: Progressing  5/21/2024 1620 by Mario Gunn, RN  Outcome: Progressing

## 2024-05-22 NOTE — PROGRESS NOTES
Progress Note  Date:2024       Room:[facility]  Patient Name:Michelle Gates     YOB: 1995     Age:29 y.o.        Subjective    Subjective  Patient followed for Sepsis with suspected UTI because of pyuria, with urine culture growing Enterococcus.  Also had left lung infiltrates but no symptoms, however, Mycoplasma IgM is reactive.  She is on Zosyn and Azithromycin.  Patient is resting comfortably with no complaints but very  anxious about her infections.  She still denies any respiratory symptoms.     Vitals Last 24 Hours:  TEMPERATURE:  Temp  Av.6 °F (37 °C)  Min: 98.4 °F (36.9 °C)  Max: 98.8 °F (37.1 °C)  RESPIRATIONS RANGE: Resp  Av.8  Min: 18  Max: 21  PULSE OXIMETRY RANGE: SpO2  Av %  Min: 100 %  Max: 100 %  PULSE RANGE: Pulse  Av  Min: 82  Max: 112  BLOOD PRESSURE RANGE: Systolic (24hrs), Av , Min:126 , Max:160   ; Diastolic (24hrs), Av, Min:76, Max:96       Objective  Vitals and nursing note reviewed. Exam conducted with a chaperone present (?Family members).   Constitutional:       General: She is not in acute distress.     Appearance: She is not ill-appearing.   HENT: unremarkable  Eyes:      Pupils: Pupils are equal, round, and reactive to light.   Cardiovascular:      Rate and Rhythm: Normal rate and regular rhythm.      Heart sounds: No murmur heard.  Pulmonary:      Effort: Pulmonary effort is normal.      Breath sounds: Normal breath sounds.   Abdominal:      General: Bowel sounds are normal. There is no distension.      Palpations: Abdomen is soft.      Tenderness: There is no abdominal tenderness.   Genitourinary:     Comments: No Monge  Musculoskeletal:      Cervical back: Neck supple.      Right lower leg: No edema.      Left lower leg: No edema.   Skin:     Findings: No rash.   Neurological:      General: No focal deficit present.      Mental Status: She is alert and oriented to person, place, and time.   Psychiatric:    normal behavior          .       Labs/Imaging/Diagnostics    Labs:  CBC:  Recent Labs     05/20/24  1952 05/21/24  0613 05/22/24  0938   WBC 16.1* 14.4* 8.0   RBC 4.81 4.64 4.65   HGB 11.6 11.1* 11.1*   HCT 36.6 35.3 35.5   MCV 76.1* 76.1* 76.3*   RDW 13.0 13.3 13.3   * 406* 379               Latest Reference Range & Units 05/20/24 22:56 05/21/24 06:13   Glucose, Ur Negative mg/dL >500 ! >500 !   Bilirubin, Urine Negative   Negative Negative   Ketones, Urine Negative mg/dL 5 ! Negative   Specific Gravity, UA 1.003 - 1.030   1.014 1.012   Blood, Urine Negative   Large ! Moderate !   Protein, UA Negative mg/dL Negative Negative   Urobilinogen 0.1 - 1.0 EU/dL 0.1 0.1   Nitrite, Urine Negative   Negative Negative   Leukocyte Esterase, Urine Negative   Trace ! Trace !   Appearance Clear   Turbid ! Clear   pH, Urine 5.0 - 8.0   5.0 5.0   Mucus, UA Negative /lpf Trace ! Trace !   WBC, UA 0 - 4 /hpf 5-10 10-20   RBC, UA 0 - 5 /hpf 0-5 0-5   Epithelial Cells, UA Few /lpf   Few   Bacteria, UA Negative /hpf Negative Negative        Procal <0.05   CRP 0.72     Mycoplasma IgM Reactive    Blood cultures (5/20) No growth 2 days  Blood cultures (5/20) No growth 2 days  Urine culture (5/20) No growth FINAL  Urine culture (5/21) Enterococcus species    Imaging Last 24 Hours:    XR CHEST PORTABLE    Result Date: 5/22/2024        Left pulmonary vascular congestion.       US Retroperitoneal (5/22)  Normal renal sonogram     Assessment//Plan           Hospital Problems             Last Modified POA    * (Principal) Hypoglycemia 5/20/2024 Yes    Sepsis (HCC) 5/20/2024 Yes      SIRS/Sepsis with hypothermia and leukocytosis   UTI, uncomplicated, with moderate pyuria,  secondary to Enterococci, on Day #2 IV Zosyn   ?Acute bronchitis secondary to Mycoplasma pneumoniae with reactive Mycoplasma IgM, Day #2 IV Azithromycin  Diabetes mellitus  Thrombocytosis, moderate, probably reactive, resolved  6.   Developmental delay     Comment:  Evidence for UTI, presumed simple

## 2024-05-22 NOTE — PROGRESS NOTES
OT eval order received and acknowledged. Per PT, patient is demonstrating baseline independence for self care tasks and functional mobility/transfers. Pt reports no need for skilled OT services at this time. OT evaluation order will therefore be discontinued this pt has no acute OT needs. Please reorder OT if pt's functional status changes. Thank you.         Functional Measure:  [facility] AM-PACTM \"6 Clicks\"                                                       Daily Activity Inpatient Short Form  How much help from another person does the patient currently need... Total; A Lot A Little None   1.  Putting on and taking off regular lower body clothing? []  1 []  2 []  3 [x]  4   2.  Bathing (including washing, rinsing, drying)? []  1 []  2 []  3 [x]  4   3.  Toileting, which includes using toilet, bedpan or urinal? [] 1 []  2 []  3 [x]  4   4.  Putting on and taking off regular upper body clothing? []  1 []  2 []  3 [x]  4   5.  Taking care of personal grooming such as brushing teeth? []  1 []  2 []  3 [x]  4   6.  Eating meals? []  1 []  2 []  3 [x]  4   © 2007, Trustees of [facility], under license to express[employer] software. All rights reserved     Score: 24/24     Interpretation of Tool:  Represents clinically-significant functional categories (i.e. Activities of daily living).  Percentage of Impairment CH    0%   CI    1-19% CJ    20-39% CK    40-59% CL    60-79% CM    80-99% CN     100%   AMPA  Score 6-24 24 23 20-22 15-19 10-14 7-9 6

## 2024-05-22 NOTE — CONSULTS
Cardiology Consult    NAME: Michelle Gates   :  1995   MRN:  [id number]     Date/Time:  2024 12:48 PM    Patient PCP: Arlin Shen MD  ________________________________________________________________________     Assessment:     Patient is a 29-year-old -American female with:  1.  Syncope  2.  Hypoglycemia  3.  Diabetes mellitus  4.  Obesity  5.  Tachycardia  6.  History of traumatic brain injury      Plan:     EKG showed sinus rhythm, sinus tachycardia.  Telemetry shows sinus tachycardia.  She appears to be asymptomatic with these episodes.  Currently on telemetry.  Check echocardiogram  Blood pressure is elevated.  Currently on metoprolol.        []        High complexity decision making was performed        Subjective:   CHIEF COMPLAINT:     Tachycardia    REASON FOR CONSULT:  Syncope    HISTORY OF PRESENT ILLNESS:     Michelle Gates is a 29 y.o. Black /  female who has a history of diabetes, obesity, autism, traumatic brain injury.  She was found unresponsive on the floor.  Blood sugar was down to the 30s.  Patient is on insulin therapy.  Your symptoms improved with D10.  Denied having any chest pain or chest tightness or shortness of breath.  Did not have any palpitation.  She has been gaining weight.  She does not smoke or drink alcohol or use illicit drugs.      Past Medical History:   Diagnosis Date    Autistic spectrum disorder     Diabetes (HCC)     Kyphosis     TBI (traumatic brain injury) (HCC)     Tic     Lower extremeties      Past Surgical History:   Procedure Laterality Date    WISDOM TOOTH EXTRACTION       Allergies   Allergen Reactions    Nickel Rash      Meds:  See below  Social History     Tobacco Use    Smoking status: Never    Smokeless tobacco: Never   Substance Use Topics    Alcohol use: Not on file      No family history on file.    REVIEW OF SYSTEMS:     []         Unable to obtain  ROS due to ---   [x]         Total of 12 systems reviewed as  Metabolic Panel    Collection Time: 05/22/24  9:38 AM   Result Value Ref Range    Sodium 138 136 - 145 mmol/L    Potassium 4.2 3.5 - 5.1 mmol/L    Chloride 106 97 - 108 mmol/L    CO2 24 21 - 32 mmol/L    Anion Gap 8 5 - 15 mmol/L    Glucose 308 (H) 65 - 100 mg/dL    BUN 5 (L) 6 - 20 mg/dL    Creatinine 0.73 0.55 - 1.02 mg/dL    BUN/Creatinine Ratio 7 (L) 12 - 20      Est, Glom Filt Rate >90 >60 ml/min/1.73m2    Calcium 9.7 8.5 - 10.1 mg/dL    Total Bilirubin 0.3 0.2 - 1.0 mg/dL    AST 10 (L) 15 - 37 U/L    ALT 15 12 - 78 U/L    Alk Phosphatase 71 45 - 117 U/L    Total Protein 6.8 6.4 - 8.2 g/dL    Albumin 3.4 (L) 3.5 - 5.0 g/dL    Globulin 3.4 2.0 - 4.0 g/dL    Albumin/Globulin Ratio 1.0 (L) 1.1 - 2.2     C-Reactive Protein    Collection Time: 05/22/24  9:38 AM   Result Value Ref Range    CRP 0.72 (H) 0.00 - 0.30 mg/dL   Procalcitonin    Collection Time: 05/22/24  9:38 AM   Result Value Ref Range    Procalcitonin <0.05 (H) 0 ng/mL   POCT Glucose    Collection Time: 05/22/24 11:55 AM   Result Value Ref Range    POC Glucose 325 (H) 65 - 100 mg/dL    Performed by: Kaleigh Martinez         Electronically signed by    Aminata Del Valle MD  May 22, 2024   12:48 PM

## 2024-05-22 NOTE — PROGRESS NOTES
History and Physical    NAME:   Michelle Gates   :  1995   MRN:  [id number]     Date/Time: 2024 10:07 AM    Patient PCP: Arlin Shen MD  ______________________________________________________________________       Subjective:     CHIEF COMPLAINT:       Hypoglycemia hypothermia      HISTORY OF PRESENT ILLNESS:     General Daily Progress Note  Patient is a 29 y.o. year old female with signal past medical history of type 1 diabetes autistic traumatic brain injury came to emergency room with hypoglycemia hypothermia  Patient normally follow-up with endocrinologist patient was found unresponsive lying in the floor blood sugar was 30 when EMS arrived patient received D10 and route blood sugar went up to 110 when I saw the patient patient was alert awake answering all the questions because of hypothermia hypoglycemia septic alert was called patient received almost 3 L blood culture urine culture was ordered received cefepime and vancomycin by the ER physician patient was admitted to the ICU for further workup         Patient alert awake denies any chest pain or shortness of breath nausea no vomiting/sugars around 200  X-ray shows atelectasis/infection        Patient alert awake denies any chest pain or shortness of breath when she walk get out of the bed patient blood pressure and heart rate increases    Family want to see a psychiatrist    Patient's parents at the bedside  Past Medical History:   Diagnosis Date    Autistic spectrum disorder     Diabetes (HCC)     Kyphosis     TBI (traumatic brain injury) (HCC)     Tic     Lower extremeties        Past Surgical History:   Procedure Laterality Date    WISDOM TOOTH EXTRACTION         Social History     Tobacco Use    Smoking status: Never    Smokeless tobacco: Never   Substance Use Topics    Alcohol use: Not on file        No family history on file.    Allergies   Allergen Reactions    Nickel Rash        Prior to Admission medications    Medication

## 2024-05-22 NOTE — CARE COORDINATION
CM reviewed clinical chart. Patient's disposition is currently Home/self. CM team will continue to monitor as needs arise.

## 2024-05-22 NOTE — PROGRESS NOTES
PULMONARY NOTE  [facility]    Name: Michelle Gates MRN: [id number]   : 1995 Hospital: [facility]   Date: 2024  Admission date: 2024 Hospital Day: 3       HPI:     Patient Active Problem List   Diagnosis    Hypokalemia    Hypophosphatemia    Hypoglycemia    Sepsis (HCC)             [x] High complexity decision making was performed  [x] See my orders for details      Subjective/Initial History:     I was asked by Reuben Regalado MD to see Michelle Gates  a 29 y.o.   female in consultation     Excerpts from admission 2024 or consult notes as follows:   29-year-old lady came in because of low blood sugar she has history of traumatic brain injury in the past also type 1 diabetes mellitus her blood sugar was low she was found unresponsive she was given D10 came to the hospital chest x-ray shows left hilar infiltrate denies any fever chest pain but she was hypothermic and hypoglycemic so admitted and pulm consult      Allergies   Allergen Reactions    Nickel Rash        MAR reviewed and pertinent medications noted or modified as needed     Current Facility-Administered Medications   Medication Dose Route Frequency Provider Last Rate Last Admin    metoprolol tartrate (LOPRESSOR) tablet 25 mg  25 mg Oral BID Reuben Regalado MD   25 mg at 24 1032    0.9 % sodium chloride infusion   IntraVENous Continuous Reuben Regalado MD 50 mL/hr at 24 1133 New Bag at 24 1133    cetirizine (ZYRTEC) tablet 10 mg  10 mg Oral Daily PRN Reuben Regalado MD        sodium chloride flush 0.9 % injection 5-40 mL  5-40 mL IntraVENous 2 times per day Reuben Regalado MD   10 mL at 24 0849    sodium chloride flush 0.9 % injection 5-40 mL  5-40 mL IntraVENous PRN Reuben Regalado MD        0.9 % sodium chloride infusion   IntraVENous PRN Reuben Regalado MD        potassium chloride (KLOR-CON M) extended release tablet 40 mEq  40 mEq Oral PRN Sabine

## 2024-05-22 NOTE — PROGRESS NOTES
PT orders received and PT screen completed.  Pt is IND and observed amb in room without difficulty.  Pt reports she has been ambulating in hallway and getting to and from bathroom IND without difficulty.  Will d/c PT orders at this time, no acute skilled PT needs identified.  Will be happy to reassess if needs arise.  Thank you!

## 2024-05-23 LAB
ALBUMIN SERPL-MCNC: 3.4 G/DL (ref 3.5–5)
ALBUMIN/GLOB SERPL: 0.9 (ref 1.1–2.2)
ALP SERPL-CCNC: 71 U/L (ref 45–117)
ALT SERPL-CCNC: 15 U/L (ref 12–78)
ANION GAP SERPL CALC-SCNC: 6 MMOL/L (ref 5–15)
AST SERPL W P-5'-P-CCNC: 5 U/L (ref 15–37)
BACTERIA SPEC CULT: ABNORMAL
BACTERIA SPEC CULT: ABNORMAL
BILIRUB SERPL-MCNC: 0.3 MG/DL (ref 0.2–1)
BUN SERPL-MCNC: 10 MG/DL (ref 6–20)
BUN/CREAT SERPL: 16 (ref 12–20)
CA-I BLD-MCNC: 9.6 MG/DL (ref 8.5–10.1)
CHLORIDE SERPL-SCNC: 102 MMOL/L (ref 97–108)
CO2 SERPL-SCNC: 25 MMOL/L (ref 21–32)
COLONY COUNT, CNT: ABNORMAL
COLONY COUNT, CNT: ABNORMAL
CREAT SERPL-MCNC: 0.62 MG/DL (ref 0.55–1.02)
CRP SERPL-MCNC: 0.57 MG/DL (ref 0–0.3)
ECHO AO ASC DIAM: 2.4 CM
ECHO AO ASCENDING AORTA INDEX: 1.28 CM/M2
ECHO AO ROOT DIAM: 2.7 CM
ECHO AO ROOT INDEX: 1.44 CM/M2
ECHO AV AREA PEAK VELOCITY: 3.3 CM2
ECHO AV AREA/BSA PEAK VELOCITY: 1.8 CM2/M2
ECHO AV PEAK GRADIENT: 7 MMHG
ECHO AV PEAK VELOCITY: 1.3 M/S
ECHO AV VELOCITY RATIO: 1
ECHO BSA: 1.94 M2
ECHO EST RA PRESSURE: 3 MMHG
ECHO IVC PROX: 1 CM
ECHO LA AREA 2C: 8.6 CM2
ECHO LA AREA 4C: 10.4 CM2
ECHO LA DIAMETER INDEX: 1.93 CM/M2
ECHO LA DIAMETER: 3.6 CM
ECHO LA MAJOR AXIS: 4.1 CM
ECHO LA MINOR AXIS: 4.1 CM
ECHO LA TO AORTIC ROOT RATIO: 1.33
ECHO LA VOL BP: 17 ML (ref 22–52)
ECHO LA VOL MOD A2C: 15 ML (ref 22–52)
ECHO LA VOL MOD A4C: 21 ML (ref 22–52)
ECHO LA VOL/BSA BIPLANE: 9 ML/M2 (ref 16–34)
ECHO LA VOLUME INDEX MOD A2C: 8 ML/M2 (ref 16–34)
ECHO LA VOLUME INDEX MOD A4C: 11 ML/M2 (ref 16–34)
ECHO LV E' LATERAL VELOCITY: 15 CM/S
ECHO LV E' SEPTAL VELOCITY: 10 CM/S
ECHO LV FRACTIONAL SHORTENING: 40 % (ref 28–44)
ECHO LV INTERNAL DIMENSION DIASTOLE INDEX: 1.87 CM/M2
ECHO LV INTERNAL DIMENSION DIASTOLIC: 3.5 CM (ref 3.9–5.3)
ECHO LV INTERNAL DIMENSION SYSTOLIC INDEX: 1.12 CM/M2
ECHO LV INTERNAL DIMENSION SYSTOLIC: 2.1 CM
ECHO LV IVSD: 1.5 CM (ref 0.6–0.9)
ECHO LV MASS 2D: 163.2 G (ref 67–162)
ECHO LV MASS INDEX 2D: 87.3 G/M2 (ref 43–95)
ECHO LV POSTERIOR WALL DIASTOLIC: 1.2 CM (ref 0.6–0.9)
ECHO LV RELATIVE WALL THICKNESS RATIO: 0.69
ECHO LVOT AREA: 3.5 CM2
ECHO LVOT DIAM: 2.1 CM
ECHO LVOT PEAK GRADIENT: 6 MMHG
ECHO LVOT PEAK VELOCITY: 1.3 M/S
ECHO MV A VELOCITY: 0.6 M/S
ECHO MV E DECELERATION TIME (DT): 159 MS
ECHO MV E VELOCITY: 1.12 M/S
ECHO MV E/A RATIO: 1.87
ECHO MV E/E' LATERAL: 7.47
ECHO MV E/E' RATIO (AVERAGED): 9.33
ECHO MV E/E' SEPTAL: 11.2
ECHO PV ACCELERATION TIME (AT): 124 MS
ECHO PV MAX VELOCITY: 1.2 M/S
ECHO PV PEAK GRADIENT: 5 MMHG
ECHO RA AREA 4C: 8.1 CM2
ECHO RA END SYSTOLIC VOLUME APICAL 4 CHAMBER INDEX BSA: 7 ML/M2
ECHO RA VOLUME: 14 ML
ECHO RIGHT VENTRICULAR SYSTOLIC PRESSURE (RVSP): 15 MMHG
ECHO RV BASAL DIMENSION: 2.6 CM
ECHO RV FREE WALL PEAK S': 14 CM/S
ECHO RV TAPSE: 2.1 CM (ref 1.7–?)
ECHO TV REGURGITANT MAX VELOCITY: 1.75 M/S
ERYTHROCYTE [DISTWIDTH] IN BLOOD BY AUTOMATED COUNT: 13.2 % (ref 11.5–14.5)
GLOBULIN SER CALC-MCNC: 4 G/DL (ref 2–4)
GLUCOSE BLD STRIP.AUTO-MCNC: 230 MG/DL (ref 65–100)
GLUCOSE BLD STRIP.AUTO-MCNC: 242 MG/DL (ref 65–100)
GLUCOSE BLD STRIP.AUTO-MCNC: 313 MG/DL (ref 65–100)
GLUCOSE SERPL-MCNC: 272 MG/DL (ref 65–100)
HCT VFR BLD AUTO: 36.4 % (ref 35–47)
HGB BLD-MCNC: 11.4 G/DL (ref 11.5–16)
L PNEUMO1 AG UR QL IA: NEGATIVE
Lab: ABNORMAL
MCH RBC QN AUTO: 23.8 PG (ref 26–34)
MCHC RBC AUTO-ENTMCNC: 31.3 G/DL (ref 30–36.5)
MCV RBC AUTO: 76.2 FL (ref 80–99)
NRBC # BLD: 0 K/UL (ref 0–0.01)
NRBC BLD-RTO: 0 PER 100 WBC
PERFORMED BY:: ABNORMAL
PLATELET # BLD AUTO: 411 K/UL (ref 150–400)
PMV BLD AUTO: 10 FL (ref 8.9–12.9)
POTASSIUM SERPL-SCNC: 4 MMOL/L (ref 3.5–5.1)
PROCALCITONIN SERPL-MCNC: <0.05 NG/ML
PROT SERPL-MCNC: 7.4 G/DL (ref 6.4–8.2)
RBC # BLD AUTO: 4.78 M/UL (ref 3.8–5.2)
SODIUM SERPL-SCNC: 133 MMOL/L (ref 136–145)
SPECIMEN SOURCE: NORMAL
WBC # BLD AUTO: 7.3 K/UL (ref 3.6–11)

## 2024-05-23 PROCEDURE — 6360000002 HC RX W HCPCS: Performed by: FAMILY MEDICINE

## 2024-05-23 PROCEDURE — 1100000000 HC RM PRIVATE

## 2024-05-23 PROCEDURE — 86140 C-REACTIVE PROTEIN: CPT

## 2024-05-23 PROCEDURE — 84145 PROCALCITONIN (PCT): CPT

## 2024-05-23 PROCEDURE — 82962 GLUCOSE BLOOD TEST: CPT

## 2024-05-23 PROCEDURE — 2580000003 HC RX 258: Performed by: FAMILY MEDICINE

## 2024-05-23 PROCEDURE — 85027 COMPLETE CBC AUTOMATED: CPT

## 2024-05-23 PROCEDURE — 36415 COLL VENOUS BLD VENIPUNCTURE: CPT

## 2024-05-23 PROCEDURE — 6370000000 HC RX 637 (ALT 250 FOR IP): Performed by: INTERNAL MEDICINE

## 2024-05-23 PROCEDURE — 99232 SBSQ HOSP IP/OBS MODERATE 35: CPT | Performed by: INTERNAL MEDICINE

## 2024-05-23 PROCEDURE — 6370000000 HC RX 637 (ALT 250 FOR IP): Performed by: FAMILY MEDICINE

## 2024-05-23 PROCEDURE — 80053 COMPREHEN METABOLIC PANEL: CPT

## 2024-05-23 RX ADMIN — PIPERACILLIN AND TAZOBACTAM 3375 MG: 3; .375 INJECTION, POWDER, LYOPHILIZED, FOR SOLUTION INTRAVENOUS at 06:04

## 2024-05-23 RX ADMIN — AZITHROMYCIN DIHYDRATE 250 MG: 500 TABLET ORAL at 09:14

## 2024-05-23 RX ADMIN — METOPROLOL TARTRATE 25 MG: 25 TABLET, FILM COATED ORAL at 09:14

## 2024-05-23 RX ADMIN — METOPROLOL TARTRATE 25 MG: 25 TABLET, FILM COATED ORAL at 21:46

## 2024-05-23 RX ADMIN — SODIUM CHLORIDE: 9 INJECTION, SOLUTION INTRAVENOUS at 21:53

## 2024-05-23 RX ADMIN — INSULIN LISPRO 3 UNITS: 100 INJECTION, SOLUTION INTRAVENOUS; SUBCUTANEOUS at 12:26

## 2024-05-23 RX ADMIN — ENOXAPARIN SODIUM 40 MG: 100 INJECTION SUBCUTANEOUS at 09:14

## 2024-05-23 RX ADMIN — PIPERACILLIN AND TAZOBACTAM 3375 MG: 3; .375 INJECTION, POWDER, LYOPHILIZED, FOR SOLUTION INTRAVENOUS at 14:16

## 2024-05-23 RX ADMIN — SODIUM CHLORIDE, PRESERVATIVE FREE 10 ML: 5 INJECTION INTRAVENOUS at 21:46

## 2024-05-23 RX ADMIN — INSULIN LISPRO 1 UNITS: 100 INJECTION, SOLUTION INTRAVENOUS; SUBCUTANEOUS at 16:37

## 2024-05-23 RX ADMIN — LEVOFLOXACIN 750 MG: 500 TABLET, FILM COATED ORAL at 21:45

## 2024-05-23 RX ADMIN — SODIUM CHLORIDE, PRESERVATIVE FREE 10 ML: 5 INJECTION INTRAVENOUS at 09:30

## 2024-05-23 NOTE — PROGRESS NOTES
Progress Note      5/23/2024 10:20 AM  NAME: Michelle Gates   MRN:  [id number]   Admit Diagnosis: Hypoglycemia [E16.2]  Sepsis (HCC) [A41.9]      Problem List:     Patient is a 29-year-old -American female with:  1.  Syncope  2.  Hypoglycemia  3.  Diabetes mellitus  4.  Obesity  5.  Tachycardia  6.  History of traumatic brain injury       Assessment/Plan:     Patient is currently on droplet precaution for mycoplasma pneumonia.  Anyhow telemetry showed normal sinus rhythm.  Blood pressure is normal  Currently on azithromycin.  Currently on metoprolol           []       High complexity decision making was performed in this patient at high risk for decompensation with multiple organ involvement.    Subjective:     Michelle Gates denies chest pain, dyspnea.  Discussed with RN events overnight.     Review of Systems:   Negative except for as noted above.    Objective:      Physical Exam:    Last 24hrs VS reviewed since prior progress note. Most recent are:    /89   Pulse 89   Temp 98.4 °F (36.9 °C)   Resp 18   Ht 1.499 m (4' 11\")   Wt 93 kg (205 lb 0.4 oz)   SpO2 100%   BMI 41.41 kg/m²     Intake/Output Summary (Last 24 hours) at 5/23/2024 1020  Last data filed at 5/23/2024 0615  Gross per 24 hour   Intake 120 ml   Output --   Net 120 ml        General Appearance: Alert; no acute distress.  Ears/Nose/Mouth/Throat: moist mucous membranes  Neck: Supple.  Chest: Lungs clear to auscultation bilaterally.  Cardiovascular: Regular rate and rhythm, S1S2 normal  Abdomen: Soft, non-tender, bowel sounds are active.  Extremities: No edema bilaterally.  Skin: Warm and dry.      PMH/SH reviewed - no change compared to H&P    Telemetry:     EKG:     No valid procedures specified.    []  No new EKG for review    Lab Data Personally Reviewed:    Recent Labs     05/22/24  0938 05/23/24  0603   WBC 8.0 7.3   HGB 11.1* 11.4*   HCT 35.5 36.4    411*     No results for input(s): \"INR\", \"APTT\" in the last 72

## 2024-05-23 NOTE — PROGRESS NOTES
History and Physical    NAME:   Michelle Gates   :  1995   MRN:  [id number]     Date/Time: 2024 9:42 AM    Patient PCP: Arlin Shen MD  ______________________________________________________________________       Subjective:     CHIEF COMPLAINT:       Hypoglycemia hypothermia      HISTORY OF PRESENT ILLNESS:     General Daily Progress Note  Patient is a 29 y.o. year old female with signal past medical history of type 1 diabetes autistic traumatic brain injury came to emergency room with hypoglycemia hypothermia  Patient normally follow-up with endocrinologist patient was found unresponsive lying in the floor blood sugar was 30 when EMS arrived patient received D10 and route blood sugar went up to 110 when I saw the patient patient was alert awake answering all the questions because of hypothermia hypoglycemia septic alert was called patient received almost 3 L blood culture urine culture was ordered received cefepime and vancomycin by the ER physician patient was admitted to the ICU for further workup         Patient alert awake denies any chest pain or shortness of breath nausea no vomiting/sugars around 200  X-ray shows atelectasis/infection        Patient alert awake denies any chest pain or shortness of breath when she walk get out of the bed patient blood pressure and heart rate increases    Family want to see a psychiatrist    Patient's parents at the bedside        Patient feeling much better    Mycoplasma positive  Urine culture positive for Enterococcus  Past Medical History:   Diagnosis Date    Autistic spectrum disorder     Diabetes (HCC)     Kyphosis     TBI (traumatic brain injury) (HCC)     Tic     Lower extremeties        Past Surgical History:   Procedure Laterality Date    WISDOM TOOTH EXTRACTION         Social History     Tobacco Use    Smoking status: Never    Smokeless tobacco: Never   Substance Use Topics    Alcohol use: Not on file        No family history on

## 2024-05-23 NOTE — PROGRESS NOTES
Completed Specimen: Urine Updated: 05/22/24 1038     Special Requests No Special Requests        Culture       No significant growth, <10,000 CFU/mL          Culture, Blood 1 [[id number]]     Order Status: Canceled Specimen: Blood     Culture, Blood 2 [[id number]]     Order Status: Canceled Specimen: Blood     Blood Culture 1 [[id number]] Collected: 05/20/24 2223    Order Status: Completed Specimen: Blood Updated: 05/23/24 0226     Special Requests No Special Requests        Culture No growth 3 days       Blood Culture 2 [[id number]] Collected: 05/20/24 2223    Order Status: Completed Specimen: Blood Updated: 05/23/24 0226     Special Requests No Special Requests        Culture No growth 3 days                Imaging:  XR CHEST PORTABLE    Result Date: 5/20/2024  EXAM:  XR CHEST PORTABLE INDICATION: Hypoglycemia. Sepsis. COMPARISON: 6/16/2020 TECHNIQUE: Portable AP chest view at 2208 hours FINDINGS: The cardiac silhouette is within normal limits. The pulmonary vasculature is within normal limits. There are low lung volumes with left perihilar opacity. There is no pleural effusion or pneumothorax. The visualized bones and upper abdomen are age-appropriate.     Low lung volumes with left perihilar opacity that may represent atelectasis or infection.        ARTERIAL BLOOD GAS  No results for input(s): \"PHART\", \"YKP9MZP\", \"PO2ART\", \"TLO1XGM\", \"BEART\", \"XPR5XFVY\", \"HGBART\", \"NJ6XBYWTG\", \"FIO2A\", \"F4VHXALG\", \"OXYHEM\", \"CARBOXHGBART\", \"METHGBART\", \"G9OVNZKUB\", \"PHCORART\", \"TEMP\" in the last 72 hours.    Invalid input(s): \"XPC0TMRWS\"  No results found for this or any previous visit.    IMPRESSION:   Community-acquired pneumonia Mycoplasma pneumonia  Hyperglycemia  Hypothermia resolved   Hypokalemia resolved  Type 1 diabetes mellitus      RECOMMENDATIONS/PLAN:     29-year-old lady came in because of low blood sugar chest x-ray was done which showed infiltrate left hilar infiltrate  Patient on Zosyn and Zithromax possibility  of aspiration  Sliding scale hypoglycemia protocol Accu-Chek       5/22 on room air CXR shows L hilar infiltrate or atelectasis   5/23 mycoplasma pneumonia atypical on Zithromax and Zosyn          Jett Coombs MD

## 2024-05-23 NOTE — PROGRESS NOTES
Findings: No rash.   Neurological:      General: No focal deficit present.      Mental Status: She is alert and oriented to person, place, and time.   Psychiatric:    normal behavior          .      Labs/Imaging/Diagnostics    Labs:  CBC:  Recent Labs     05/21/24  0613 05/22/24  0938 05/23/24  0603   WBC 14.4* 8.0 7.3   RBC 4.64 4.65 4.78   HGB 11.1* 11.1* 11.4*   HCT 35.3 35.5 36.4   MCV 76.1* 76.3* 76.2*   RDW 13.3 13.3 13.2   * 379 411*                 Latest Reference Range & Units 05/20/24 22:56 05/21/24 06:13   Glucose, Ur Negative mg/dL >500 ! >500 !   Bilirubin, Urine Negative   Negative Negative   Ketones, Urine Negative mg/dL 5 ! Negative   Specific Gravity, UA 1.003 - 1.030   1.014 1.012   Blood, Urine Negative   Large ! Moderate !   Protein, UA Negative mg/dL Negative Negative   Urobilinogen 0.1 - 1.0 EU/dL 0.1 0.1   Nitrite, Urine Negative   Negative Negative   Leukocyte Esterase, Urine Negative   Trace ! Trace !   Appearance Clear   Turbid ! Clear   pH, Urine 5.0 - 8.0   5.0 5.0   Mucus, UA Negative /lpf Trace ! Trace !   WBC, UA 0 - 4 /hpf 5-10 10-20   RBC, UA 0 - 5 /hpf 0-5 0-5   Epithelial Cells, UA Few /lpf   Few   Bacteria, UA Negative /hpf Negative Negative        Procal <0.05   CRP 0.57 <0.72     Mycoplasma IgM Reactive    Blood cultures (5/20) No growth 3 days  Blood cultures (5/20) No growth 3 days  Urine culture (5/20) No growth FINAL  Urine culture (5/21) Enterococcus faecalis      Antibiotic Interpretation Microscan    ampicillin Sensitive <=2 ug/mL   ciprofloxacin Sensitive <=0.5 ug/mL   nitrofurantoin Sensitive <=16 ug/mL   tetracycline Resistant >=16 ug/mL   vancomycin Sensitive 1 ug/mL   levofloxacin Sensitive 1 ug/mL   linezolid Sensitive 2 ug/mL   DAPTOmycin Sensitive 2 ug/mL        Imaging Last 24 Hours:    XR CHEST PORTABLE  Result Date: 5/22/2024     Left pulmonary vascular congestion.       US Retroperitoneal (5/22)  Normal renal sonogram     Assessment//Plan            Hospital Problems             Last Modified POA    * (Principal) Hypoglycemia 5/20/2024 Yes    Sepsis (HCC) 5/20/2024 Yes    SIRS/Sepsis with hypothermia and leukocytosis, elevated CRP, resolved or resolving   UTI, uncomplicated, with moderate pyuria,  secondary to Enterococci, on Day #3 IV Zosyn   ?Acute bronchitis secondary to Mycoplasma pneumoniae with reactive Mycoplasma IgM, Day #3 IV Azithromycin  Diabetes mellitus  Thrombocytosis, moderate, probably reactive, resolved  6.   Developmental delay     Comment:  Evidence for UTI, presumed simple cystitis, involving Enterococci which is sensitive to Levaquin which would also cover Mycoplasma.     Plan   1. Discontinue IV Zosyn and Azithromycin  2. Start Levaquin 750 mg po daily for 7 days  3. Follow-up blood cultures  4. Cleared for discharge from ID standpoint          Electronically signed by Albin Matos MD

## 2024-05-23 NOTE — PLAN OF CARE
Problem: Discharge Planning  Goal: Discharge to home or other facility with appropriate resources  5/22/2024 2113 by Hakeem Bhatti, RN  Outcome: Progressing  5/22/2024 1548 by Mario Gunn, RN  Outcome: Progressing     Problem: Safety - Adult  Goal: Free from fall injury  5/22/2024 2113 by Hakeem Bhatti, RN  Outcome: Progressing  5/22/2024 1548 by Mario Gunn, RN  Outcome: Progressing

## 2024-05-23 NOTE — CONSULTS
magnesium 2.1.  X-ray chest, impression:  Low lung volume with left perihilar opacity that may be representing atelectasis or infection.    VITAL SIGNS:  Today, temperature 98.1, respiratory rate 18, pulse 95, SpO2 100 on room air, blood pressure 124/81, weight 93.5 kg.    MENTAL STATUS:  Average height, slightly obese female patient, well dressed, well groomed, polite, cooperative, verbal, articulate.  Little bit pressurized speech and asking mother for confirmation.  Not suicidal, not homicidal, not psychotic.  No hallucination.  No delusions.  She wants to be an artist.  At this time, she do not want to visit a psychiatrist or have medication, but she is receptive to talk with a counselor.    DIAGNOSES:  Autism spectrum disorder; pneumonia; diabetes mellitus, type 1; fluctuating blood sugars; episode of hypoglycemia; hypothermia.    DISPOSITION:  From a psych point of view, I will take another look at her.  She seems to be fairly stable at her baseline, but I will take another look at tomorrow.  She is receptive to getting and seeing a therapist with some direction.        MD MAREK MCCOY/MARQUES  D:  05/22/2024 22:30:02  T:  05/22/2024 23:42:58  JOB #:  [id number]

## 2024-05-24 VITALS
RESPIRATION RATE: 18 BRPM | WEIGHT: 205.03 LBS | TEMPERATURE: 98.5 F | BODY MASS INDEX: 41.33 KG/M2 | SYSTOLIC BLOOD PRESSURE: 108 MMHG | OXYGEN SATURATION: 97 % | HEART RATE: 105 BPM | HEIGHT: 59 IN | DIASTOLIC BLOOD PRESSURE: 76 MMHG

## 2024-05-24 LAB
CRP SERPL-MCNC: 0.58 MG/DL (ref 0–0.3)
GLUCOSE BLD STRIP.AUTO-MCNC: 199 MG/DL (ref 65–100)
GLUCOSE BLD STRIP.AUTO-MCNC: 270 MG/DL (ref 65–100)
PERFORMED BY:: ABNORMAL
PERFORMED BY:: ABNORMAL

## 2024-05-24 PROCEDURE — 6360000002 HC RX W HCPCS: Performed by: FAMILY MEDICINE

## 2024-05-24 PROCEDURE — 86140 C-REACTIVE PROTEIN: CPT

## 2024-05-24 PROCEDURE — 6370000000 HC RX 637 (ALT 250 FOR IP): Performed by: FAMILY MEDICINE

## 2024-05-24 PROCEDURE — 36415 COLL VENOUS BLD VENIPUNCTURE: CPT

## 2024-05-24 PROCEDURE — 99232 SBSQ HOSP IP/OBS MODERATE 35: CPT | Performed by: INTERNAL MEDICINE

## 2024-05-24 PROCEDURE — 82962 GLUCOSE BLOOD TEST: CPT

## 2024-05-24 PROCEDURE — 6370000000 HC RX 637 (ALT 250 FOR IP): Performed by: INTERNAL MEDICINE

## 2024-05-24 PROCEDURE — 2580000003 HC RX 258: Performed by: FAMILY MEDICINE

## 2024-05-24 RX ORDER — LEVOFLOXACIN 750 MG/1
750 TABLET, FILM COATED ORAL DAILY
Qty: 5 TABLET | Refills: 0 | Status: SHIPPED | OUTPATIENT
Start: 2024-05-25 | End: 2024-05-30

## 2024-05-24 RX ADMIN — METOPROLOL TARTRATE 25 MG: 25 TABLET, FILM COATED ORAL at 08:29

## 2024-05-24 RX ADMIN — SODIUM CHLORIDE, PRESERVATIVE FREE 10 ML: 5 INJECTION INTRAVENOUS at 08:30

## 2024-05-24 RX ADMIN — LEVOFLOXACIN 750 MG: 500 TABLET, FILM COATED ORAL at 08:29

## 2024-05-24 RX ADMIN — ENOXAPARIN SODIUM 40 MG: 100 INJECTION SUBCUTANEOUS at 08:29

## 2024-05-24 NOTE — PROGRESS NOTES
Progress Note      5/24/2024 8:40 AM  NAME: Michelle Gates   MRN:  [id number]   Admit Diagnosis: Hypoglycemia [E16.2]  Sepsis (HCC) [A41.9]      Problem List:     Patient is a 29-year-old -American female with:  1.  Syncope  2.  Hypoglycemia  3.  Diabetes mellitus  4.  Obesity  5.  Tachycardia  6.  History of traumatic brain injury       Assessment/Plan:     No acute events overnight.  Remains in sinus rhythm, sinus tachycardia.  No further cardiac testing is planned at this time.  Will sign off and remain available if needed         []       High complexity decision making was performed in this patient at high risk for decompensation with multiple organ involvement.    Subjective:     Michelle Gates denies chest pain, dyspnea.  Discussed with RN events overnight.     Review of Systems:   Negative except for as noted above.    Objective:      Physical Exam:    Last 24hrs VS reviewed since prior progress note. Most recent are:    /76   Pulse (!) 105   Temp 98.5 °F (36.9 °C) (Oral)   Resp 18   Ht 1.499 m (4' 11\")   Wt 93 kg (205 lb 0.4 oz)   SpO2 97%   BMI 41.41 kg/m²     Intake/Output Summary (Last 24 hours) at 5/24/2024 0840  Last data filed at 5/24/2024 0630  Gross per 24 hour   Intake 800 ml   Output 500 ml   Net 300 ml          General Appearance: Alert; no acute distress.  Ears/Nose/Mouth/Throat: moist mucous membranes  Neck: Supple.  Chest: Lungs clear to auscultation bilaterally.  Cardiovascular: Regular rate and rhythm, S1S2 normal  Abdomen: Soft, non-tender, bowel sounds are active.  Extremities: No edema bilaterally.  Skin: Warm and dry.      PMH/SH reviewed - no change compared to H&P    Telemetry:     EKG:     No valid procedures specified.    []  No new EKG for review    Lab Data Personally Reviewed:    Recent Labs     05/22/24  0938 05/23/24  0603   WBC 8.0 7.3   HGB 11.1* 11.4*   HCT 35.5 36.4    411*       No results for input(s): \"INR\", \"APTT\" in the last 72

## 2024-05-24 NOTE — DISCHARGE SUMMARY
Discharge Summary       PATIENT ID: Michelle Gates  MRN: [id number]   YOB: 1995    DATE OF ADMISSION: 5/20/2024   DATE OF DISCHARGE:   PRIMARY CARE PROVIDER: [unfilled]      ATTENDING PHYSICIAN: Reuben Regalado  DISCHARGING PROVIDER: Reuben Regalado      CONSULTATIONS: IP CONSULT TO INFECTIOUS DISEASES  IP CONSULT TO PULMONOLOGY  IP CONSULT TO PSYCHIATRY  IP CONSULT TO CARDIOLOGY    PROCEDURES/SURGERIES: * No surgery found *    ADMITTING DIAGNOSES:    Patient Active Problem List    Diagnosis Date Noted    Hypoglycemia 05/20/2024    Sepsis (HCC) 05/20/2024    Hypokalemia 06/18/2020    Hypophosphatemia 06/18/2020       DISCHARGE DIAGNOSES / PLAN:      Hypoglycemia hypothermia  Type 1 diabetes  Mycoplasma pneumonia  UTI with Enterococcus        DISCHARGE MEDICATIONS:     Medication List        START taking these medications      levoFLOXacin 750 MG tablet  Commonly known as: LEVAQUIN  Take 1 tablet by mouth daily for 5 doses  Start taking on: May 25, 2024     metoprolol tartrate 25 MG tablet  Commonly known as: LOPRESSOR  Take 1 tablet by mouth 2 times daily            CHANGE how you take these medications      Insulin Degludec 100 UNIT/ML Sopn  Inject 20 Units into the skin daily  What changed: See the new instructions.            CONTINUE taking these medications      cetirizine 10 MG tablet  Commonly known as: ZYRTEC     insulin aspart 100 UNIT/ML injection pen  Commonly known as: NovoLOG     vitamin D 25 MCG (1000 UT) Tabs tablet  Commonly known as: CHOLECALCIFEROL            STOP taking these medications      hydrOXYzine HCl 25 MG tablet  Commonly known as: ATARAX               Where to Get Your Medications        These medications were sent to [facility]/pharmacy [address] - [address] [facility] - P [phone] - F [phone]  [address] [address]      Phone: [phone]   Insulin Degludec 100 UNIT/ML Sopn  levoFLOXacin 750 MG tablet  metoprolol tartrate 25 MG

## 2024-05-24 NOTE — PROGRESS NOTES
Progress Note  Date:2024       Room:[facility]  Patient Name:Michelle Gates     YOB: 1995     Age:29 y.o.        Subjective    Subjective  Patient followed for Sepsis with suspected UTI because of pyuria, with urine culture growing Enterococcus.  Also had left lung infiltrates but no symptoms, however, Mycoplasma IgM is reactive.  She is now oral Levaquin.  Patient has been discharged.      Vitals Last 24 Hours:  TEMPERATURE:  Temp  Av.5 °F (36.9 °C)  Min: 98.2 °F (36.8 °C)  Max: 98.8 °F (37.1 °C)  RESPIRATIONS RANGE: Resp  Av  Min: 18  Max: 18  PULSE OXIMETRY RANGE: SpO2  Av.3 %  Min: 97 %  Max: 99 %  PULSE RANGE: Pulse  Av.3  Min: 77  Max: 105  BLOOD PRESSURE RANGE: Systolic (24hrs), Av , Min:108 , Max:125   ; Diastolic (24hrs), Av, Min:76, Max:87       Objective:  Vital signs: (most recent): Blood pressure 108/76, pulse (!) 105, temperature 98.5 °F (36.9 °C), temperature source Oral, resp. rate 18, height 1.499 m (4' 11\"), weight 93 kg (205 lb 0.4 oz), SpO2 97 %.      Vitals and nursing note reviewed.  Patient unavailable for re-examination  Constitutional:       General: She is not in acute distress.     Appearance: She is not ill-appearing.   HENT: unremarkable  Eyes:      Pupils: Pupils are equal, round, and reactive to light.   Cardiovascular:      Rate and Rhythm: Normal rate and regular rhythm.      Heart sounds: No murmur heard.  Pulmonary:      Effort: Pulmonary effort is normal.      Breath sounds: Normal breath sounds.   Abdominal:      General: Bowel sounds are normal. There is no distension.      Palpations: Abdomen is soft.      Tenderness: There is no abdominal tenderness.   Genitourinary:     Comments: No Monge  Musculoskeletal:      Cervical back: Neck supple.      Right lower leg: No edema.      Left lower leg: No edema.   Skin:     Findings: No rash.   Neurological:      General: No focal deficit present.      Mental Status: She is alert and oriented

## 2024-05-24 NOTE — CARE COORDINATION
CM noted discharge order. No CM needs. Nurse aware.     Transition of Care Plan:    RUR: 8%  Prior Level of Functioning: Independent  Disposition: Home  If SNF or IPR: Date FOC offered: n/a  Date FOC received: n/a  Accepting facility: n/a  Date authorization started with reference number: n/a  Date authorization received and expires: n/a  Follow up appointments: Per MD  DME needed: n/a  Transportation at discharge:   IM/IMM Medicare/ letter given: n/a  Is patient a  and connected with [facility]? N/a   If yes, was [place] transfer form completed and [facility] notified?   Caregiver Contact: n/a  Discharge Caregiver contacted prior to discharge? N/a  Care Conference needed? no  Barriers to discharge: none

## 2024-05-24 NOTE — PROGRESS NOTES
Sensitive      tetracycline >=16 ug/mL Resistant      vancomycin 1 ug/mL Sensitive                           Culture, Urine [[id number]] Collected: 05/20/24 2256    Order Status: Completed Specimen: Urine Updated: 05/22/24 1038     Special Requests No Special Requests        Culture       No significant growth, <10,000 CFU/mL          Culture, Blood 1 [[id number]]     Order Status: Canceled Specimen: Blood     Culture, Blood 2 [[id number]]     Order Status: Canceled Specimen: Blood     Blood Culture 1 [[id number]] Collected: 05/20/24 2223    Order Status: Completed Specimen: Blood Updated: 05/23/24 0226     Special Requests No Special Requests        Culture No growth 3 days       Blood Culture 2 [[id number]] Collected: 05/20/24 2223    Order Status: Completed Specimen: Blood Updated: 05/23/24 0226     Special Requests No Special Requests        Culture No growth 3 days                Imaging:  XR CHEST PORTABLE    Result Date: 5/20/2024  EXAM:  XR CHEST PORTABLE INDICATION: Hypoglycemia. Sepsis. COMPARISON: 6/16/2020 TECHNIQUE: Portable AP chest view at 2208 hours FINDINGS: The cardiac silhouette is within normal limits. The pulmonary vasculature is within normal limits. There are low lung volumes with left perihilar opacity. There is no pleural effusion or pneumothorax. The visualized bones and upper abdomen are age-appropriate.     Low lung volumes with left perihilar opacity that may represent atelectasis or infection.        ARTERIAL BLOOD GAS  No results for input(s): \"PHART\", \"QVD5OVK\", \"PO2ART\", \"RUV4QUZ\", \"BEART\", \"WUE8KIGS\", \"HGBART\", \"VR9WTNUKV\", \"FIO2A\", \"O5DVIWRE\", \"OXYHEM\", \"CARBOXHGBART\", \"METHGBART\", \"W7QNCUOMP\", \"PHCORART\", \"TEMP\" in the last 72 hours.    Invalid input(s): \"IXD1WQUPU\"  No results found for this or any previous visit.    IMPRESSION:   Community-acquired pneumonia Mycoplasma pneumonia  Hyperglycemia  Hypothermia resolved   Hypokalemia resolved  Type 1 diabetes mellitus

## 2024-05-24 NOTE — DISCHARGE INSTRUCTIONS
Discharge Instructions       PATIENT ID: Michelle Gates  MRN: [id number]   YOB: 1995    DATE OF ADMISSION: 5/20/2024  DATE OF DISCHARGE: 5/24/2024    PRIMARY CARE PROVIDER: [unfilled]     ATTENDING PHYSICIAN: Reuben Regalado MD   DISCHARGING PROVIDER: Reuben Regalado MD    To contact this individual call [phone] and ask the  to page.   If unavailable, ask to be transferred the Adult Hospitalist Department.    DISCHARGE DIAGNOSES hypoglycemia pneumonia UTI    CONSULTATIONS: [unfilled]      PROCEDURES/SURGERIES: * No surgery found *    PENDING TEST RESULTS:   At the time of discharge the following test results are still pending: None    FOLLOW UP APPOINTMENTS:   Arlin Shen MD  [address] [facility] [address] [phone]    Schedule an appointment as soon as possible for a visit in 1 week(s)      Arlin Shen MD  [address] [facility] [address] [phone]             ADDITIONAL CARE RECOMMENDATIONS: Follow-up with endocrinologist PCP    DIET: Previous diet      ACTIVITY: Activity as tolerated    Wound care: None Needed    EQUIPMENT needed: None Needed      DISCHARGE MEDICATIONS:   See Medication Reconciliation Form    It is important that you take the medication exactly as they are prescribed.   Keep your medication in the bottles provided by the pharmacist and keep a list of the medication names, dosages, and times to be taken in your wallet.   Do not take other medications without consulting your doctor.       NOTIFY YOUR PHYSICIAN FOR ANY OF THE FOLLOWING:   Fever over 101 degrees for 24 hours.   Chest pain, shortness of breath, fever, chills, nausea, vomiting, diarrhea, change in mentation, falling, weakness, bleeding. Severe pain or pain not relieved by medications.  Or, any other signs or symptoms that you may have questions about.      DISPOSITION:    Home With:   OT  PT  HH  RN       SNF/Inpatient Rehab/LTAC    Independent/assisted living

## 2024-05-24 NOTE — PROGRESS NOTES
Reviewed discharge paperwork with patient and patients mom.  No questions or concerns.  IV and telemetry removed.  Pt wheeled down to mom's vehicle to be transported home.

## 2024-05-26 LAB
BACTERIA SPEC CULT: NORMAL
BACTERIA SPEC CULT: NORMAL
Lab: NORMAL
Lab: NORMAL